# Patient Record
Sex: FEMALE | Race: WHITE | NOT HISPANIC OR LATINO | Employment: FULL TIME | URBAN - METROPOLITAN AREA
[De-identification: names, ages, dates, MRNs, and addresses within clinical notes are randomized per-mention and may not be internally consistent; named-entity substitution may affect disease eponyms.]

---

## 2018-01-16 ENCOUNTER — OFFICE VISIT (OUTPATIENT)
Dept: PLASTIC SURGERY | Facility: CLINIC | Age: 38
End: 2018-01-16
Attending: PLASTIC SURGERY
Payer: COMMERCIAL

## 2018-01-16 VITALS
TEMPERATURE: 98.6 F | SYSTOLIC BLOOD PRESSURE: 149 MMHG | WEIGHT: 192.13 LBS | DIASTOLIC BLOOD PRESSURE: 97 MMHG | HEART RATE: 97 BPM | BODY MASS INDEX: 30.88 KG/M2 | OXYGEN SATURATION: 97 % | RESPIRATION RATE: 16 BRPM | HEIGHT: 66 IN

## 2018-01-16 DIAGNOSIS — N62 HYPERTROPHY OF BREAST: Primary | ICD-10-CM

## 2018-01-16 PROCEDURE — G0463 HOSPITAL OUTPT CLINIC VISIT: HCPCS | Mod: ZF

## 2018-01-16 ASSESSMENT — PAIN SCALES - GENERAL: PAINLEVEL: NO PAIN (0)

## 2018-01-16 NOTE — LETTER
1/16/2018       RE: Rosa Maria Kelley  [address]  [facility] [address]     Dear Colleague,    Thank you for referring your patient, Rosa Maria Kelley, to the [facility] at [facility]. Please see a copy of my visit note below.    PRESENTING COMPLAINT:  Breast reduction surgery.      HISTORY OF PRESENT ILLNESS:  Rosa Maria is 38 years old.  She has been large breasted all of her adult and adolescent life.  She wears an H-cup bra.  She would like to be around a C or D cup.  She is unhappy with the amount of weight that she has to carry on her upper neck, back and shoulders from her heavy breasts.  She gets a lot of upper back, neck, shoulder pain, shoulder grooving from the bra straps and inframammary fold rashes, especially during summers.  She has used all sorts of over-the-counter pain medication and appropriate supportive garments without continued relief.  She has never had a mammogram.  No family history of breast cancer.  Never had breast biopsy.      PAST MEDICAL HISTORY:  Nil.      PAST SURGICAL HISTORY:  Hysterectomy.      MEDICATIONS:  Testosterone implant.      ALLERGIES:  Nil.      SOCIAL HISTORY:  Does not smoke, socially drinks.  Works in [employer].      REVIEW OF SYSTEMS:  Denies chest pain, shortness of breath, MI, CVA, DVT and PE.      PHYSICAL EXAMINATION:  Vital signs stable.  She is afebrile, in no obvious distress.  She is 5 feet 6 inches, 192 pounds, body surface area 1.98 m2.  On examination of her breasts, she has grade 2 on 3 ptosis.  Left breast is slightly lower than the right.  Right side is about 5% larger than the left.  Sternal notch to nipple distance well under 40 cm.  No palpable masses.  No axillary or cervical lymphadenopathy.      ASSESSMENT AND PLAN:  Based on above findings, a diagnosis of symptomatic bilateral breast hypertrophy was made.  I had a long discussion with the patient and her  about breast reduction surgery.  I  explained to them what that would entail.  Showed her where the scars would be.  Explained to her the concept as well as the expectations from the surgery.  I was very clear about the fact that I could not guarantee cup sizes, but based on her exam and her Schnur scale, 600 grams needs to be removed from each breast.  I think that is possible, but I do not think it will leave her with a D-cup bra and would probably leave her with a small C cup.  She needs to be okay with that.  She seems like she is.  Had a long discussion with her about prior authorization.  Consent was obtained and photographs obtained.  We will try and get prior authorization as soon as possible.  Went over the surgery itself with her in detail.  All risks, benefits and alternatives of the procedure including pain, infection, bleeding, scarring, asymmetry, seromas, hematomas, wound breakdown, wound dehiscence, prominent scar, hypertrophic scar, keloid scar, nipple sensory loss, nipple sensory change, breast sensory loss, breast sensory change, skin necrosis, fat necrosis, T-junction site necrosis, nipple necrosis, asymmetries of the breasts, standing cutaneous cones, seromas, hematomas, DVT, PE, MI, CVA, pneumonia, renal failure and death were all explained.  She will need a mammogram prior to the surgery.  She will need to be cleared from a medical standpoint.  All questions were answered.  She was happy with the visit.  I look forward to helping her out in the near future.  All exam and discussion were done in the presence of my nurse.      Total time spent with patient was 40 minutes, more than half was counseling.         Again, thank you for allowing me to participate in the care of your patient.      Sincerely,    NUSRAT Rahman MD

## 2018-01-16 NOTE — MR AVS SNAPSHOT
"              After Visit Summary   2018    Rosa Maria Kelley    MRN: [id number]           Patient Information     Date Of Birth          1980        Visit Information        Provider Department      2018 8:00 AM NUSRAT Rahman MD [facility]        Today's Diagnoses     Hypertrophy of breast    -  1       Follow-ups after your visit        Follow-up notes from your care team     Return if symptoms worsen or fail to improve.      Who to contact     If you have questions or need follow up information about today's clinic visit or your schedule please contact [facility] directly at [phone].  Normal or non-critical lab and imaging results will be communicated to you by [facility], letter or phone within 4 business days after the clinic has received the results. If you do not hear from us within 7 days, please contact the clinic through [facility] or phone. If you have a critical or abnormal lab result, we will notify you by phone as soon as possible.  Submit refill requests through [facility] or call your pharmacy and they will forward the refill request to us. Please allow 3 business days for your refill to be completed.          Additional Information About Your Visit        MyChart Information     Plerts lets you send messages to your doctor, view your test results, renew your prescriptions, schedule appointments and more. To sign up, go to [email or web address] . Click on \"Log in\" on the left side of the screen, which will take you to the Welcome page. Then click on \"Sign up Now\" on the right side of the page.     You will be asked to enter the access code listed below, as well as some personal information. Please follow the directions to create your username and password.     Your access code is: [email or web address]  Expires: 2018  9:25 AM     Your access code will  in 90 days. If you need help or a new code, please call your [place] clinic or [phone].        Care " "EveryWhere ID     This is your Care EveryWhere ID. This could be used by other organizations to access your [facility] medical records  [id number]        Your Vitals Were     Pulse Temperature Respirations Height Pulse Oximetry BMI (Body Mass Index)    97 98.6  F (37  C) (Oral) 16 5' 6\" 97% 31.01 kg/m2       Blood Pressure from Last 3 Encounters:   01/16/18 (!) 149/97    Weight from Last 3 Encounters:   01/16/18 192 lb 2 oz              Today, you had the following     No orders found for display       Primary Care Provider    None Specified       No primary provider on file.        Equal Access to Services     [facility]: Hadii barrera Ceballos, washannon tim, papo bee, tali cruz . So [facility] [phone].    ATENCIÓN: Si habla español, tiene a montejo disposición servicios gratuitos de asistencia lingüística. Llame al [phone].    We comply with applicable federal civil rights laws and [place] laws. We do not discriminate on the basis of race, color, national origin, age, disability, sex, sexual orientation, or gender identity.            Thank you!     Thank you for choosing [facility]  for your care. Our goal is always to provide you with excellent care. Hearing back from our patients is one way we can continue to improve our services. Please take a few minutes to complete the written survey that you may receive in the mail after your visit with us. Thank you!             Your Updated Medication List - Protect others around you: Learn how to safely use, store and throw away your medicines at www.disposemymeds.org.      Notice  As of 1/16/2018 10:12 AM    You have not been prescribed any medications.      "

## 2018-01-16 NOTE — NURSING NOTE
"  Oncology Rooming Note    January 16, 2018 8:18 AM   Rosa aMria Kelley is a 38 year old female who presents for:    Chief Complaint   Patient presents with     Oncology Clinic Visit     new patient breast reduction     Initial Vitals: BP (!) 149/97 (BP Location: Right arm, Patient Position: Chair, Cuff Size: Adult Regular)  Pulse 97  Temp 98.6  F (37  C) (Oral)  Resp 16  Ht 1.676 m (5' 6\")  Wt 87.1 kg (192 lb 2 oz)  SpO2 97%  BMI 31.01 kg/m2 Estimated body mass index is 31.01 kg/(m^2) as calculated from the following:    Height as of this encounter: 1.676 m (5' 6\").    Weight as of this encounter: 87.1 kg (192 lb 2 oz). Body surface area is 2.01 meters squared.  No Pain (0) Comment: Data Unavailable   No LMP recorded. Patient has had a hysterectomy.  Allergies reviewed: No  Medications reviewed: No    Medications: Medication refills not needed today.  Pharmacy name entered into EPIC: Data Unavailable    Clinical concerns: new patient Dr. Rahman was NOT notified.    5 minutes for nursing intake (face to face time)     Diana Macdonald CMA                          "

## 2018-01-16 NOTE — PROGRESS NOTES
PRESENTING COMPLAINT:  Breast reduction surgery.      HISTORY OF PRESENT ILLNESS:  Rosa Maria is 38 years old.  She has been large breasted all of her adult and adolescent life.  She wears an H-cup bra.  She would like to be around a C or D cup.  She is unhappy with the amount of weight that she has to carry on her upper neck, back and shoulders from her heavy breasts.  She gets a lot of upper back, neck, shoulder pain, shoulder grooving from the bra straps and inframammary fold rashes, especially during summers.  She has used all sorts of over-the-counter pain medication and appropriate supportive garments without continued relief.  She has never had a mammogram.  No family history of breast cancer.  Never had breast biopsy.      PAST MEDICAL HISTORY:  Nil.      PAST SURGICAL HISTORY:  Hysterectomy.      MEDICATIONS:  Testosterone implant.      ALLERGIES:  Nil.      SOCIAL HISTORY:  Does not smoke, socially drinks.  Works in [employer].      REVIEW OF SYSTEMS:  Denies chest pain, shortness of breath, MI, CVA, DVT and PE.      PHYSICAL EXAMINATION:  Vital signs stable.  She is afebrile, in no obvious distress.  She is 5 feet 6 inches, 192 pounds, body surface area 1.98 m2.  On examination of her breasts, she has grade 2 on 3 ptosis.  Left breast is slightly lower than the right.  Right side is about 5% larger than the left.  Sternal notch to nipple distance well under 40 cm.  No palpable masses.  No axillary or cervical lymphadenopathy.      ASSESSMENT AND PLAN:  Based on above findings, a diagnosis of symptomatic bilateral breast hypertrophy was made.  I had a long discussion with the patient and her  about breast reduction surgery.  I explained to them what that would entail.  Showed her where the scars would be.  Explained to her the concept as well as the expectations from the surgery.  I was very clear about the fact that I could not guarantee cup sizes, but based on her exam and her Schnur scale, 600 grams  needs to be removed from each breast.  I think that is possible, but I do not think it will leave her with a D-cup bra and would probably leave her with a small C cup.  She needs to be okay with that.  She seems like she is.  Had a long discussion with her about prior authorization.  Consent was obtained and photographs obtained.  We will try and get prior authorization as soon as possible.  Went over the surgery itself with her in detail.  All risks, benefits and alternatives of the procedure including pain, infection, bleeding, scarring, asymmetry, seromas, hematomas, wound breakdown, wound dehiscence, prominent scar, hypertrophic scar, keloid scar, nipple sensory loss, nipple sensory change, breast sensory loss, breast sensory change, skin necrosis, fat necrosis, T-junction site necrosis, nipple necrosis, asymmetries of the breasts, standing cutaneous cones, seromas, hematomas, DVT, PE, MI, CVA, pneumonia, renal failure and death were all explained.  She will need a mammogram prior to the surgery.  She will need to be cleared from a medical standpoint.  All questions were answered.  She was happy with the visit.  I look forward to helping her out in the near future.  All exam and discussion were done in the presence of my nurse.      Total time spent with patient was 40 minutes, more than half was counseling.

## 2018-01-16 NOTE — LETTER
Date:January 17, 2018      Patient was self referred, no letter generated. Do not send.        [facility] Health Information

## 2018-01-18 ENCOUNTER — TELEPHONE (OUTPATIENT)
Dept: SURGERY | Facility: CLINIC | Age: 38
End: 2018-01-18

## 2018-01-22 ENCOUNTER — TELEPHONE (OUTPATIENT)
Dept: SURGERY | Facility: CLINIC | Age: 38
End: 2018-01-22

## 2018-01-22 NOTE — TELEPHONE ENCOUNTER
Faxed clinical to [facility], ref#[id number] and clinical to [facility], called and let patient know I was doing this, should take 5-15 days to hear back

## 2018-01-23 ENCOUNTER — TELEPHONE (OUTPATIENT)
Dept: SURGERY | Facility: CLINIC | Age: 38
End: 2018-01-23

## 2018-01-23 NOTE — TELEPHONE ENCOUNTER
Received phone call from Sadaf at [facility], wanting to know if there were photos, emailed them to [email or web address]

## 2018-01-25 ENCOUNTER — TRANSFERRED RECORDS (OUTPATIENT)
Dept: HEALTH INFORMATION MANAGEMENT | Facility: CLINIC | Age: 38
End: 2018-01-25

## 2018-01-29 ENCOUNTER — TELEPHONE (OUTPATIENT)
Dept: SURGERY | Facility: CLINIC | Age: 38
End: 2018-01-29

## 2018-01-29 NOTE — TELEPHONE ENCOUNTER
Received phone call from Sydnie @[facility], surgery for bilateral reduction mammoplasty was approved, CASE#[id number], with date span 1/22/18-1/22/19.  A copy will not be sent to me.  Need to wait for approval from [facility]

## 2018-02-06 ENCOUNTER — TELEPHONE (OUTPATIENT)
Dept: SURGERY | Facility: CLINIC | Age: 38
End: 2018-02-06

## 2018-02-06 NOTE — TELEPHONE ENCOUNTER
Talked to [email or web address], surgery was approved, I then received a fax for the approavl, left voice message with patient to call me back

## 2018-02-19 ENCOUNTER — TELEPHONE (OUTPATIENT)
Dept: SURGERY | Facility: CLINIC | Age: 38
End: 2018-02-19

## 2018-02-19 NOTE — TELEPHONE ENCOUNTER
pt called, wanting to know if I received copy of approval letter from [facility], read the notes from January, no, they are not sending a copy to me.  Patient wanted to know how many grams were approved, told her it was 600 per breast per notes.  Told her maybe she can call [facility] and ask for a copy of the authorization.  Also she is interested in changing the pre-op date, so she will call Lori

## 2018-03-20 ENCOUNTER — OFFICE VISIT (OUTPATIENT)
Dept: PLASTIC SURGERY | Facility: CLINIC | Age: 38
End: 2018-03-20
Attending: PLASTIC SURGERY
Payer: COMMERCIAL

## 2018-03-20 VITALS
HEART RATE: 60 BPM | OXYGEN SATURATION: 97 % | WEIGHT: 180.5 LBS | TEMPERATURE: 98.5 F | SYSTOLIC BLOOD PRESSURE: 142 MMHG | HEIGHT: 66 IN | DIASTOLIC BLOOD PRESSURE: 92 MMHG | RESPIRATION RATE: 18 BRPM | BODY MASS INDEX: 29.01 KG/M2

## 2018-03-20 DIAGNOSIS — N62 HYPERTROPHY OF BREAST: Primary | ICD-10-CM

## 2018-03-20 PROCEDURE — G0463 HOSPITAL OUTPT CLINIC VISIT: HCPCS | Mod: ZF

## 2018-03-20 ASSESSMENT — PAIN SCALES - GENERAL: PAINLEVEL: NO PAIN (0)

## 2018-03-20 NOTE — LETTER
3/20/2018       RE: Rosa Maria Kelley  [address]  [facility] [address]     Dear Colleague,    Thank you for referring your patient, Rosa Maria Kelley, to the [facility] at [facility]. Please see a copy of my visit note below.    PRESENTING COMPLAINT:  Preoperative visit for upcoming bilateral breast reduction scheduled for Monday.      HISTORY OF PRESENT ILLNESS:  Ms. Kelley is 38 years old.  She is scheduled for breast reduction.  The plan is to do a 600-gram reduction.  She had a mammogram done recently, which was according to the patient normal.  She has no other change in her history and physical exam.      ASSESSMENT AND PLAN:  Based on above findings, a diagnosis of bilateral breast hypertrophy was made.  Plan is to do a bilateral breast reduction.  Went over the entire procedure with her in detail.  All risks, benefits and alternatives were explained in the consult once again and were explained in detail.  She understood everything and wants to proceed.  All questions were answered.  All exam done in the presence of my nurse.  She was happy with the visit.      Total time spent with patient was 15 minutes, more than half was counseling.         Again, thank you for allowing me to participate in the care of your patient.      Sincerely,    NUSRAT Rahman MD

## 2018-03-20 NOTE — NURSING NOTE
"Oncology Rooming Note    March 20, 2018 10:35 AM   Rosa Maria Kelley is a 38 year old female who presents for:    No chief complaint on file.    Initial Vitals: BP (!) 142/92 (BP Location: Right arm, Patient Position: Sitting, Cuff Size: Adult Regular)  Pulse 60  Temp 98.5  F (36.9  C) (Oral)  Resp 18  Ht 1.676 m (5' 5.98\")  Wt 81.9 kg (180 lb 8 oz)  SpO2 97%  BMI 29.15 kg/m2 Estimated body mass index is 29.15 kg/(m^2) as calculated from the following:    Height as of this encounter: 1.676 m (5' 5.98\").    Weight as of this encounter: 81.9 kg (180 lb 8 oz). Body surface area is 1.95 meters squared.  No Pain (0) Comment: Data Unavailable   No LMP recorded. Patient has had a hysterectomy.  Allergies reviewed: Yes  Medications reviewed: Yes    Medications: Medication refills not needed today.  Pharmacy name entered into [facility]: [place], [place] -  [facility] [phone]    Clinical concerns: No new concerns. Provider was notified.    10 minutes for nursing intake (face to face time)     Jeny Perry LPN              "

## 2018-03-20 NOTE — PROGRESS NOTES
PRESENTING COMPLAINT:  Preoperative visit for upcoming bilateral breast reduction scheduled for Monday.      HISTORY OF PRESENT ILLNESS:  Ms. Kelley is 38 years old.  She is scheduled for breast reduction.  The plan is to do a 600-gram reduction.  She had a mammogram done recently, which was according to the patient normal.  She has no other change in her history and physical exam.      ASSESSMENT AND PLAN:  Based on above findings, a diagnosis of bilateral breast hypertrophy was made.  Plan is to do a bilateral breast reduction.  Went over the entire procedure with her in detail.  All risks, benefits and alternatives were explained in the consult once again and were explained in detail.  She understood everything and wants to proceed.  All questions were answered.  All exam done in the presence of my nurse.  She was happy with the visit.      Total time spent with patient was 15 minutes, more than half was counseling.

## 2018-03-20 NOTE — MR AVS SNAPSHOT
After Visit Summary   3/20/2018    Rosa Maria Kelley    MRN: [id number]           Patient Information     Date Of Birth          1980        Visit Information        Provider Department      3/20/2018 10:45 AM NUSRAT Rahman MD [facility]        Today's Diagnoses     Hypertrophy of breast    -  1       Follow-ups after your visit        Follow-up notes from your care team     Return if symptoms worsen or fail to improve.      Your next 10 appointments already scheduled     Mar 26, 2018   Procedure with NUSRAT Rahman MD   [facility] ([facility])    [address] [place] [address]  5th Floor  [facility] [address] [phone]           Located in the Clinics [facility] at [address] [place] [address].   parking is very convenient and highly recommended.  is a $6 flat rate fee.  Both  and self parkers should enter the main arrival plaza from [facility]; parking attendants will direct you based on your parking preference.            Apr 03, 2018  7:15 AM CDT   (Arrive by 7:00 AM)   Post-Op with NUSRAT Rahman MD   [facility] ([facility])    [address] [place] [address]  [address]  [facility] [address] [phone]              Who to contact     If you have questions or need follow up information about today's clinic visit or your schedule please contact [facility] directly at [phone].  Normal or non-critical lab and imaging results will be communicated to you by MyChart, letter or phone within 4 business days after the clinic has received the results. If you do not hear from us within 7 days, please contact the clinic through MyChart or phone. If you have a critical or abnormal lab result, we will notify you by phone as soon as possible.  Submit refill requests through [facility] or call your pharmacy and they will forward the refill  "request to us. Please allow 3 business days for your refill to be completed.          Additional Information About Your Visit        MyChart Information     [employer] lets you send messages to your doctor, view your test results, renew your prescriptions, schedule appointments and more. To sign up, go to [email or web address] . Click on \"Log in\" on the left side of the screen, which will take you to the Welcome page. Then click on \"Sign up Now\" on the right side of the page.     You will be asked to enter the access code listed below, as well as some personal information. Please follow the directions to create your username and password.     Your access code is: [email or web address]  Expires: 2018  7:30 AM     Your access code will  in 90 days. If you need help or a new code, please call your [place] clinic or [phone].        Care EveryWhere ID     This is your [facility] EveryWhere ID. This could be used by other organizations to access your [place] medical records  [id number]        Your Vitals Were     Pulse Temperature Respirations Height Pulse Oximetry BMI (Body Mass Index)    60 98.5  F (36.9  C) (Oral) 18 5' 5.98\" 97% 29.15 kg/m2       Blood Pressure from Last 3 Encounters:   18 (!) 142/92   18 (!) 149/97    Weight from Last 3 Encounters:   18 180 lb 8 oz   18 192 lb 2 oz              Today, you had the following     No orders found for display       Primary Care Provider Office Phone # Fax #    Trace Morgan [phone]       [place] [id number]        Equal Access to Services     [facility] : Tommy Ceballos, jass tim, tali tucker. So [facility] [phone].    ATENCIÓN: Si habla español, tiene a montejo disposición servicios gratuitos de asistencia lingüística. Llame al [phone].    We comply with applicable federal civil rights laws and " [place] laws. We do not discriminate on the basis of race, color, national origin, age, disability, sex, sexual orientation, or gender identity.            Thank you!     Thank you for choosing [facility]  for your care. Our goal is always to provide you with excellent care. Hearing back from our patients is one way we can continue to improve our services. Please take a few minutes to complete the written survey that you may receive in the mail after your visit with us. Thank you!             Your Updated Medication List - Protect others around you: Learn how to safely use, store and throw away your medicines at www.disposemymeds.org.      Notice  As of 3/20/2018  4:55 PM    You have not been prescribed any medications.

## 2018-03-23 ENCOUNTER — ANESTHESIA EVENT (OUTPATIENT)
Dept: SURGERY | Facility: AMBULATORY SURGERY CENTER | Age: 38
End: 2018-03-23

## 2018-03-26 ENCOUNTER — HOSPITAL ENCOUNTER (OUTPATIENT)
Facility: AMBULATORY SURGERY CENTER | Age: 38
End: 2018-03-26
Attending: PLASTIC SURGERY
Payer: COMMERCIAL

## 2018-03-26 ENCOUNTER — SURGERY (OUTPATIENT)
Age: 38
End: 2018-03-26

## 2018-03-26 ENCOUNTER — ANESTHESIA (OUTPATIENT)
Dept: SURGERY | Facility: AMBULATORY SURGERY CENTER | Age: 38
End: 2018-03-26

## 2018-03-26 VITALS
HEIGHT: 66 IN | WEIGHT: 178 LBS | DIASTOLIC BLOOD PRESSURE: 80 MMHG | BODY MASS INDEX: 28.61 KG/M2 | RESPIRATION RATE: 12 BRPM | TEMPERATURE: 97 F | OXYGEN SATURATION: 95 % | SYSTOLIC BLOOD PRESSURE: 122 MMHG

## 2018-03-26 DIAGNOSIS — Z98.890 S/P BILATERAL BREAST REDUCTION: Primary | ICD-10-CM

## 2018-03-26 RX ORDER — DEXAMETHASONE SODIUM PHOSPHATE 4 MG/ML
INJECTION, SOLUTION INTRA-ARTICULAR; INTRALESIONAL; INTRAMUSCULAR; INTRAVENOUS; SOFT TISSUE PRN
Status: DISCONTINUED | OUTPATIENT
Start: 2018-03-26 | End: 2018-03-26

## 2018-03-26 RX ORDER — ONDANSETRON 4 MG/1
4 TABLET, ORALLY DISINTEGRATING ORAL EVERY 30 MIN PRN
Status: DISCONTINUED | OUTPATIENT
Start: 2018-03-26 | End: 2018-03-27 | Stop reason: HOSPADM

## 2018-03-26 RX ORDER — AMOXICILLIN 250 MG
1-2 CAPSULE ORAL 2 TIMES DAILY
Qty: 30 TABLET | Refills: 0 | Status: SHIPPED | OUTPATIENT
Start: 2018-03-26

## 2018-03-26 RX ORDER — SODIUM CHLORIDE, SODIUM LACTATE, POTASSIUM CHLORIDE, CALCIUM CHLORIDE 600; 310; 30; 20 MG/100ML; MG/100ML; MG/100ML; MG/100ML
INJECTION, SOLUTION INTRAVENOUS CONTINUOUS
Status: DISCONTINUED | OUTPATIENT
Start: 2018-03-26 | End: 2018-03-27 | Stop reason: HOSPADM

## 2018-03-26 RX ORDER — PROPOFOL 10 MG/ML
INJECTION, EMULSION INTRAVENOUS CONTINUOUS PRN
Status: DISCONTINUED | OUTPATIENT
Start: 2018-03-26 | End: 2018-03-26

## 2018-03-26 RX ORDER — SODIUM CHLORIDE, SODIUM LACTATE, POTASSIUM CHLORIDE, CALCIUM CHLORIDE 600; 310; 30; 20 MG/100ML; MG/100ML; MG/100ML; MG/100ML
INJECTION, SOLUTION INTRAVENOUS CONTINUOUS
Status: DISCONTINUED | OUTPATIENT
Start: 2018-03-26 | End: 2018-03-26 | Stop reason: HOSPADM

## 2018-03-26 RX ORDER — ONDANSETRON 2 MG/ML
INJECTION INTRAMUSCULAR; INTRAVENOUS PRN
Status: DISCONTINUED | OUTPATIENT
Start: 2018-03-26 | End: 2018-03-26

## 2018-03-26 RX ORDER — NALOXONE HYDROCHLORIDE 0.4 MG/ML
.1-.4 INJECTION, SOLUTION INTRAMUSCULAR; INTRAVENOUS; SUBCUTANEOUS
Status: DISCONTINUED | OUTPATIENT
Start: 2018-03-26 | End: 2018-03-27 | Stop reason: HOSPADM

## 2018-03-26 RX ORDER — FENTANYL CITRATE 50 UG/ML
25-50 INJECTION, SOLUTION INTRAMUSCULAR; INTRAVENOUS EVERY 5 MIN PRN
Status: DISCONTINUED | OUTPATIENT
Start: 2018-03-26 | End: 2018-03-26 | Stop reason: HOSPADM

## 2018-03-26 RX ORDER — FENTANYL CITRATE 50 UG/ML
25-50 INJECTION, SOLUTION INTRAMUSCULAR; INTRAVENOUS
Status: DISCONTINUED | OUTPATIENT
Start: 2018-03-26 | End: 2018-03-26 | Stop reason: HOSPADM

## 2018-03-26 RX ORDER — OXYCODONE HYDROCHLORIDE 5 MG/1
5 TABLET ORAL EVERY 4 HOURS PRN
Status: DISCONTINUED | OUTPATIENT
Start: 2018-03-26 | End: 2018-03-27 | Stop reason: HOSPADM

## 2018-03-26 RX ORDER — ONDANSETRON 4 MG/1
4 TABLET, ORALLY DISINTEGRATING ORAL EVERY 6 HOURS PRN
Qty: 8 TABLET | Refills: 0 | Status: SHIPPED | OUTPATIENT
Start: 2018-03-26

## 2018-03-26 RX ORDER — PROPOFOL 10 MG/ML
INJECTION, EMULSION INTRAVENOUS PRN
Status: DISCONTINUED | OUTPATIENT
Start: 2018-03-26 | End: 2018-03-26

## 2018-03-26 RX ORDER — NALOXONE HYDROCHLORIDE 0.4 MG/ML
.1-.4 INJECTION, SOLUTION INTRAMUSCULAR; INTRAVENOUS; SUBCUTANEOUS
Status: DISCONTINUED | OUTPATIENT
Start: 2018-03-26 | End: 2018-03-26 | Stop reason: HOSPADM

## 2018-03-26 RX ORDER — ACETAMINOPHEN 325 MG/1
975 TABLET ORAL ONCE
Status: COMPLETED | OUTPATIENT
Start: 2018-03-26 | End: 2018-03-26

## 2018-03-26 RX ORDER — KETOROLAC TROMETHAMINE 30 MG/ML
INJECTION, SOLUTION INTRAMUSCULAR; INTRAVENOUS PRN
Status: DISCONTINUED | OUTPATIENT
Start: 2018-03-26 | End: 2018-03-26

## 2018-03-26 RX ORDER — ONDANSETRON 2 MG/ML
4 INJECTION INTRAMUSCULAR; INTRAVENOUS EVERY 30 MIN PRN
Status: DISCONTINUED | OUTPATIENT
Start: 2018-03-26 | End: 2018-03-27 | Stop reason: HOSPADM

## 2018-03-26 RX ORDER — GABAPENTIN 300 MG/1
300 CAPSULE ORAL ONCE
Status: COMPLETED | OUTPATIENT
Start: 2018-03-26 | End: 2018-03-26

## 2018-03-26 RX ORDER — MEPERIDINE HYDROCHLORIDE 25 MG/ML
12.5 INJECTION INTRAMUSCULAR; INTRAVENOUS; SUBCUTANEOUS
Status: DISCONTINUED | OUTPATIENT
Start: 2018-03-26 | End: 2018-03-27 | Stop reason: HOSPADM

## 2018-03-26 RX ORDER — FLUMAZENIL 0.1 MG/ML
0.2 INJECTION, SOLUTION INTRAVENOUS
Status: DISCONTINUED | OUTPATIENT
Start: 2018-03-26 | End: 2018-03-26 | Stop reason: HOSPADM

## 2018-03-26 RX ORDER — OXYCODONE HYDROCHLORIDE 5 MG/1
5-10 TABLET ORAL EVERY 6 HOURS PRN
Qty: 30 TABLET | Refills: 0 | Status: SHIPPED | OUTPATIENT
Start: 2018-03-26

## 2018-03-26 RX ORDER — BUPIVACAINE HYDROCHLORIDE AND EPINEPHRINE 2.5; 5 MG/ML; UG/ML
INJECTION, SOLUTION INFILTRATION; PERINEURAL PRN
Status: DISCONTINUED | OUTPATIENT
Start: 2018-03-26 | End: 2018-03-26

## 2018-03-26 RX ADMIN — OXYCODONE HYDROCHLORIDE 5 MG: 5 TABLET ORAL at 10:13

## 2018-03-26 RX ADMIN — DEXAMETHASONE SODIUM PHOSPHATE 4 MG: 4 INJECTION, SOLUTION INTRA-ARTICULAR; INTRALESIONAL; INTRAMUSCULAR; INTRAVENOUS; SOFT TISSUE at 08:00

## 2018-03-26 RX ADMIN — KETOROLAC TROMETHAMINE 30 MG: 30 INJECTION, SOLUTION INTRAMUSCULAR; INTRAVENOUS at 09:15

## 2018-03-26 RX ADMIN — FENTANYL CITRATE 50 MCG: 50 INJECTION, SOLUTION INTRAMUSCULAR; INTRAVENOUS at 08:32

## 2018-03-26 RX ADMIN — PROPOFOL 160 MG: 10 INJECTION, EMULSION INTRAVENOUS at 07:55

## 2018-03-26 RX ADMIN — FENTANYL CITRATE 50 MCG: 50 INJECTION, SOLUTION INTRAMUSCULAR; INTRAVENOUS at 10:07

## 2018-03-26 RX ADMIN — FENTANYL CITRATE 50 MCG: 50 INJECTION, SOLUTION INTRAMUSCULAR; INTRAVENOUS at 08:22

## 2018-03-26 RX ADMIN — ONDANSETRON 4 MG: 2 INJECTION INTRAMUSCULAR; INTRAVENOUS at 08:00

## 2018-03-26 RX ADMIN — ACETAMINOPHEN 975 MG: 325 TABLET ORAL at 07:05

## 2018-03-26 RX ADMIN — PROPOFOL 40 MG: 10 INJECTION, EMULSION INTRAVENOUS at 08:32

## 2018-03-26 RX ADMIN — PROPOFOL: 10 INJECTION, EMULSION INTRAVENOUS at 08:35

## 2018-03-26 RX ADMIN — GABAPENTIN 300 MG: 300 CAPSULE ORAL at 07:05

## 2018-03-26 RX ADMIN — FENTANYL CITRATE 50 MCG: 50 INJECTION, SOLUTION INTRAMUSCULAR; INTRAVENOUS at 10:09

## 2018-03-26 RX ADMIN — PROPOFOL 200 MCG/KG/MIN: 10 INJECTION, EMULSION INTRAVENOUS at 07:55

## 2018-03-26 RX ADMIN — FENTANYL CITRATE 50 MCG: 50 INJECTION, SOLUTION INTRAMUSCULAR; INTRAVENOUS at 07:32

## 2018-03-26 RX ADMIN — SODIUM CHLORIDE, SODIUM LACTATE, POTASSIUM CHLORIDE, CALCIUM CHLORIDE: 600; 310; 30; 20 INJECTION, SOLUTION INTRAVENOUS at 07:16

## 2018-03-26 RX ADMIN — BUPIVACAINE HYDROCHLORIDE AND EPINEPHRINE 30 ML: 2.5; 5 INJECTION, SOLUTION INFILTRATION; PERINEURAL at 07:42

## 2018-03-26 ASSESSMENT — LIFESTYLE VARIABLES: TOBACCO_USE: 0

## 2018-03-26 NOTE — DISCHARGE INSTRUCTIONS
"Information about liposomal bupivacaine (Exparel)    What is Liposomal Bupivacaine?    Liposomal Bupivacaine is a numbing medication that can help you manage your pain after surgery.  This medication is similar to \"novacaine,\" which is often used by the dentist.  Liposomal bupivacaine is released slowly and can help control pain for up to 72 hours.    What is the purpose of Liposomal Bupivacaine?    To manage your pain after surgery    To help you sleep better, take deep breaths, walk more comfortable, and feel up to visiting with others    How is the procedure done?    Liposomal bupivacaine is a medication given by an injection.    It is usually given right before your surgery.  If this is the case, you will be awake or sedated, but you should experience minimal pain during the procedure.    For some people, the injection may be given at the very end of your surgery.  It all depends on the type of surgery and your situation.    The procedure usually takes about 5-15 minutes.  An ultrasound machine will help the anesthesiologist insert it in the right place or the surgeon will inject it under direct vision.     A needle is used to place the numbing medication under your skin.  It provides pain relief by numbing the tissue in the area where your surgeon will make the incision.    What can I expect?    You may experience numbness, tingling, or a feeling of heaviness around the area that was injected.    If you experience any of the follow symptoms IMMEDIATELY CALL THE REGIONAL ANESTHESIA PAIN SERVICE:    Numbness or tingling occurs in areas other than around the injection site    Blurry vision    Ringing in your ears    A metallic taste in your mouth    PAGE: Dial [phone].  When prompted, enter the following 4-digit ID number:  [id number].  You will be prompted to enter your phone number; and then enter the # sign.  The clinician on call will call you back.    OR  CALL: Dial [phone].  Let the hospital  " know that you are having a problem with a nerve block and that you would like to speak to the regional anesthesia pain service right away.    You should not receive any other type of numbing medication within 4 days after receiving liposomal bupivacaine unless your anesthesiologist approves.    Post Operative Instructions: Regional Anesthetic with Liposomal Bupivacaine for Chest and Abdominal Surgery  General Information:   Regional anesthesia is when local anesthetic or  numbing  medication is injected around the nerves to anesthetize or  numb  the area supplied by that set of nerves.     Types of Regional Blocks:  Transversus Abdominis Plane (TAP): A block injected beneath the covering of a muscle layer of the abdomen for abdominal surgery  Pectoral: A block injected near the breast for surgery on the breast and armpit  Paravertebral: A block injected in the back for surgery on the chest, ribs, and breast    Procedure:  The type of anesthesia your doctor used to numb your chest or abdomen will usually not wear off for 24-48 hours, but may last as long as 72 hours.     Diet:  There are no restrictions on your diet. You should drink plenty of fluids.     Discomfort:  You will have a tingling and prickly sensation in your chest or abdomen as the feeling begins to return. You can also expect some discomfort. The amount of discomfort is unpredictable, but if you have more pain than can be controlled with pain medication you should notify your physician.     Pain Medicine:   Begin taking your oral pain pills before bedtime and during the night to avoid a sudden onset of pain as part of the block wears off.  Do not engage in drinking, driving, or hazardous occupations while taking pain medication.     Stitches:   You may have stitches or special skin closures. You doctor will inform you when to return to the office to have them removed. [facility]  Home Care Following Anesthesia  For  24 hours after surgery:  1. Get plenty of rest.  A responsible adult must stay with you for at least 24 hours after you leave the surgery center.  2. Do not drive or use heavy equipment.  If you have weakness or tingling, don't drive or use heavy equipment until this feeling goes away.   3. Do not drink alcohol.   4. Avoid strenuous or risky activities.  Ask for help when climbing stairs.  5. You may feel lightheaded.  IF so, sit for a few minutes before standing.  Have someone help you get up.   6. If you have nausea (feel sick to your stomach): Drink only clear liquids such as apple juice, ginger ale, broth or 7-Up.  Rest may also help.  Be sure to drink enough fluids.  Move to a regular diet as you feel able.   7. You may have a slight fever.  Call the doctor if your fever is over 100 F (37.7 C) (taken under the tongue) or lasts longer than 24 hours.  8. You may have a dry mouth, a sore throat, muscle aches or trouble sleeping. These should go away after 24 hours.  9. Do not make important or legal decisions.            Tips for taking pain medications  To get the best pain relief possible, remember these points:    Take pain medications as directed, before pain becomes severe.    Pain medication can upset your stomach: taking it with food may help.    Constipation is a common side effect of pain medication. Drink plenty of  fluids.    Eat foods high in fiber. Take a stool softener if recommended by your doctor or pharmacist.    Do not drink alcohol, drive or operate machinery while taking pain medications.    Ask about other ways to control pain, such as with heat, ice or relaxation.    Tylenol/Acetaminophen Consumption  To help encourage the safe use of acetaminophen, the makers of TYLENOL  have lowered the maximum daily dose for single-ingredient Extra Strength TYLENOL  (acetaminophen) products sold in the [place] from 8 pills per day (4,000 mg) to 6 pills per day (3,000 mg). The dosing interval has also changed  from 2 pills every 4-6 hours to 2 pills every 6 hours.    If you feel your pain relief is insufficient, you may take Tylenol/Acetaminophen in addition to your narcotic pain medication.     Be careful not to exceed 3,000 mg of Tylenol/Acetaminophen in a 24 hour period from all sources.    If you are taking extra strength Tylenol/acetaminophen (500 mg), the maximum dose is 6 tablets in 24 hours.    If you are taking regular strength acetaminophen (325 mg), the maximum dose is 9 tablets in 24 hours.    Call a doctor for any of the followin. Signs of infection (fever, growing tenderness at the surgery site, a large amount of drainage or bleeding, severe pain, foul-smelling drainage, redness, swelling).  2. It has been over 8 to 10 hours since surgery and you are still not able to urinate (pass water).  3. Headache for over 24 hours.  4. Numbness, tingling or weakness the day after surgery (if you had spinal anesthesia).  Your doctor is:  Dr. Aylin Rahman, Plastic Surgery: [phone]                      For emergency care, call the:  [place] [facility]:  [phone] (TTY for hearing impaired: [phone])            BREAST REDUCTION POST-OPERATIVE INSTRUCTIONS    Instructions       Have someone drive you home after surgery and help you at home for 1-2      days.      Get plenty of rest.      Follow balanced diet.      Decreased activity may promote constipation, so you may want to add      more raw fruit to your diet, and be sure to increase fluid intake. Your doctor       may also order a stool softener.      Take pain medication as prescribed. Do not take aspirin or any products      containing aspirin.      Do not drink alcohol when taking pain medications.      Even when not taking pain medications, no alcohol for 3 weeks as it      causes fluid retention.      If you are taking vitamins with iron, resume these as tolerated.      Do not smoke, as smoking delays healing and increases the risk  of      complications.    Activities      Do not drive fpr 10 days following your procedure and until you are no longer taking        any pain medications (narcotics).      Do not drive until you have full range of motion with your arms and can stop the car       or swerve in an emergency.      Start walking the evening of surgery, this helps to reduce swelling and       lowers the chance of blood clots.      Refrain from vigorous activities for 2-6 weeks.  Increase activity gradually as tolerated.      After 2 weeks, you may perform lower body exercise, but must wait the full 6 weeks       prior to performing upper body exercise      Avoid lifting anything over 5 pounds for 2 weeks.      Resume social and employment activities in about 2 weeks (if not too strenuous).    Incision Care      You may shower in 48 hours.  If drainage tubes have been used, you may shower       48 hours after removal of the drains. The ACE wrap (if used) may be rewrapped as needed (if too tight or loose). Use it for support and may subtitute with a sports bra if preferred.       Avoid exposing scars to sun for at least 12 months.      Always use a strong sunblock, if sun exposure is unavoidable (SPF 50 or      greater).      Keep the tape on until it starts to curl up on the ends, and then gently remove them.        You may use moisturizing cream at 10 days to help the tape come off. By two weeks,      you may pull off the tape if still in place.      Wear your surgical bra/wrap 24/7 as directed for 4 weeks.      Avoid bras with stays and underwires for 4-6 weeks.      You may pad the incisions with gauze for comfort (panty liners also work well as they        are absorbent and inexpensive).      Inspect daily for signs of infection.      No tub soaking, bathing, or swimming until wounds are healed.      If your breast skin is dry after surgery, you can apply a moisturizer several times a       day.     What to Expect      Despite the  "three layers of sutures closing your incisions, there will be some oozing       of tissue fluid from them for 2 days or so.  This will soak up on the gauze and the bra       to look like more than it really is.  Report any significant drainage to the clinic.      Most of the higher discomfort will subside after the first few days.      You may experience temporary soreness, bruising, swelling and tightnessin the       breasts as well as discomfort in the incision area.      You may have have normal sensation in the nipples.  This may be more or less than       usual, and usually returns over a couple of months.      Your first menstruation following surgery may cause your breasts to swell and hurt.      You may have random shooting pains, tingling, or other strange sensations in the            skin for a few months.  These will subside.    Appearance      Most of the discoloration and swelling will subside in 2-4 weeks.      Your breasts will feel firm to the touch initially, but will soften with time.      A more natural shape will occur as the breasts \"settle\" in a slightly lower position over     the first few months.      Scars may be red and thick for 6-12 months (longer in lighter-skinned patients).  IN          time, these usually soften and fade.    Follow-Up Care      Typically, you will have a post op check at 1-2 weeks, and again with your surgeon in      another month.      If drainage tubes have been used, will be removed when the drainage is less than 30      ml per day for 1-2 days.  This usually happens in 1-3 weeks.    When to Call      If you have increased swelling or bruising, particular one side greater than the other.      If swelling and redness persist after a few days.      If you have increased redness along the incision.      If you have severe or increased pain not relieved by medication.      If you have any side effects to medications; such as, rash, nausea,      headache, vomiting, or " constipation.      If you have an oral temperature over 100.4 degrees.      If you have any yellowish or greenish drainage from the incisions or      notice a foul odor.      If you have bleeding from the incisions that is difficult to control with      light pressure.      If you have loss of feeling or motion.      Any unanswered concern.    For Medical Questions, Please Call:      [phone], Monday - Friday, 8 a.m. - 4:30 p.m.      After hours and on weekends, call Hospital Paging at [phone] and      ask for the Plastic Surgeon on call.

## 2018-03-26 NOTE — OP NOTE
PREOPERATIVE DIAGNOSIS: Symptomatic bilateral breast hypertrophy.     POSTOPERATIVE DIAGNOSIS: Symptomatic bilateral breast hypertrophy.     PROCEDURES: Bilateral superomedial pedicle inverted T skin closure breast reduction.     SURGEON: Aylin Rahman MD.     FELLOW: Carlos Manuel Castellanos MD    ANESTHESIA: General anesthesia with endotracheal intubation.     COMPLICATIONS: Nil.     DRAINS: Nil.     Blood Loss: 100 mL    SPECIMENS: Skin and breast tissue from right breast measuring about 600 g, left breast measuring about 515 g.     DESCRIPTION OF PROCEDURE: After informed consent was taken, the proper site and procedure was ascertained with the patient and was appropriately marked and taken to in the operating room.  She was placed in supine position with the knees comfortably flexed with pillows underneath them, and pneumoboots placed and running prior to induction of anesthesia. Preoperative antibiotics given in the OR. All pressure points were appropriately padded. General anesthesia was administered without any complications. She was placed in such a position that she could be flexed to about 50 degrees. Her arms were padded and abducted to about 50 degrees. She was prepped and draped in a standard surgical fashion. I began by first remarking the preop markings and marking a 42 mm areola on each side. I then marked out a superior medial pedicle on each side. I then de-epithelialized the pedicle on each side. I then dissected out the pedicle on each side without actually seeing the deep fascia and also released the pedicle such that it could rotate into its new nipple position without any tension. I then went ahead and on each side excised the inferomedial, inferior, inferolateral and lateral aspects of the breast according to a vertical pattern reduction. Strict hemostasis was ensured during this entire part of the case. Once this was done, I then temporarily closed the patient's breast in an inverted T fashion, sat  the patient up ensured symmetry and then marked out the new areolar opening symmetrically on each side. I then went ahead and closed the horizontal incision using 2-0 Monocryl suture in a deep dermal layer and 3-0 Strattafix suture in a running dermal layer. Then I made sure that the nipple areolar complex could be retrieved without any tension, which is could on each side. I then checked that they will both pink and viable, which they were. I then went ahead and excised the skin of the new areolar opening and de-epithelialized epithelialized the portion that was involved in the pedicle on each side. I then sutured in the nipple areolar complex using 2-0 Monocryl suture in a deep dermal fashion circumferentially. I then closed the vertical incision using 2-0 Monocryl suture to approximate the medial and lateral pillars, and then the deep dermis. I then ran all the incisions with 3-0 Monocryl suture in a running intracuticular manner followed by placement of Prineo, and then followed by an ACE wrap. At the end of the case, the patient's breasts were soft, nipples were pink, and breasts were symmetric. The patient tolerated the procedure well. All counts correct at the end of the case. The patient was extubated and sent to recovery room in a stable condition.

## 2018-03-26 NOTE — ANESTHESIA PROCEDURE NOTES
Peripheral Nerve Block Procedure Note    Staff:     Anesthesiologist:  CORRIE STUBBS  Location: Pre-op  Procedure Start/Stop TImes:      3/26/2018 7:30 AM     3/26/2018 7:40 AM    patient identified, IV checked, site marked, risks and benefits discussed, informed consent, monitors and equipment checked, pre-op evaluation, at physician/surgeon's request and post-op pain management      Correct Patient: Yes      Correct Position: Yes      Correct Site: Yes      Correct Procedure: Yes      Correct Laterality:  Yes    Site Marked:  Yes  Procedure details:     Procedure:  Pectoralis    ASA:  1    Diagnosis:  Reduction surgery    Laterality:  Bilateral    Position:  Supine    Sterile Prep: chloraprep, mask and sterile gloves      Local skin infiltration:  2% lidocaine    amount (mL):  4    Needle:  Short bevel    Needle gauge:  21    Needle length (inches):  4    Ultrasound: Yes      Ultrasound used to identify targeted nerve, plexus, or vascular structure and placed a needle adjacent to it      Permanent Image entered into patiient's record      Abnormal pain on injection: No      Blood Aspirated: No      Paresthesias:  No    Bleeding at site: No      Bolus via:  Needle    Infusion Method:  Single Shot    Complications:  None  Assessment/Narrative:     Injection made incrementally with aspirations every (mL):  5     Informed consent obtained.  All risks and benefits of the nerve block discussed with the patient.  All questions answered and all parties agreed with the plan.   Block was placed at the surgeon's request for post operative pain control.

## 2018-03-26 NOTE — ANESTHESIA POSTPROCEDURE EVALUATION
Patient: Rosa Maria Kelley    Procedure(s):  Bilateral Breast Reduction - Wound Class: I-Clean    Diagnosis:Breast Hypertrophy  Diagnosis Additional Information: No value filed.    Anesthesia Type:  General, LMA, Periph. Nerve Block for postop pain    Note:  Anesthesia Post Evaluation    Patient location during evaluation: Phase 2  Patient participation: Able to fully participate in evaluation  Level of consciousness: awake and alert  Pain management: adequate  Airway patency: patent  Cardiovascular status: acceptable  Respiratory status: acceptable  Hydration status: acceptable  PONV: none     Anesthetic complications: None          Last vitals:  Vitals:    03/26/18 1015 03/26/18 1029 03/26/18 1056   BP: 120/86 124/82 122/80   Resp: 8 12 12   Temp: 36.1  C (97  F) 36.1  C (97  F) 36.1  C (97  F)   SpO2: 98% 95%          Electronically Signed By: Nicola Headley DO  March 26, 2018  2:10 PM

## 2018-03-26 NOTE — IP AVS SNAPSHOT
"                  MRN:[id number]                      After Visit Summary   3/26/2018    Rosa Maria Kelley    MRN: [id number]           Thank you!     Thank you for choosing [facility] for your care. Our goal is always to provide you with excellent care. Hearing back from our patients is one way we can continue to improve our services. Please take a few minutes to complete the written survey that you may receive in the mail after you visit with us. Thank you!        Patient Information     Date Of Birth          1980        About your hospital stay     You were admitted on:  March 26, 2018 You last received care in the:  [facility]    You were discharged on:  March 26, 2018       Who to Call     For medical emergencies, please call 911.  For non-urgent questions about your medical care, please call your primary care provider or clinic, [phone]  For questions related to your surgery, please call your surgery clinic        Attending Provider     Provider Specialty    NUSRAT Rahman MD Plastic Surgery       Primary Care Provider Office Phone # Fax #    Trace Gurvinder Morgan [phone]      Your next 10 appointments already scheduled     Apr 03, 2018  7:15 AM CDT   (Arrive by 7:00 AM)   Post-Op with NUSRAT Rahman MD   [facility] ([facility])    [address] [place] [address]  [address] [place] [id number] [phone]              Further instructions from your care team       Information about liposomal bupivacaine (Exparel)    What is Liposomal Bupivacaine?    Liposomal Bupivacaine is a numbing medication that can help you manage your pain after surgery.  This medication is similar to \"novacaine,\" which is often used by the dentist.  Liposomal bupivacaine is released slowly and can help control pain for up to 72 hours.    What is the purpose of Liposomal Bupivacaine?    To manage your pain after surgery    To help you " sleep better, take deep breaths, walk more comfortable, and feel up to visiting with others    How is the procedure done?    Liposomal bupivacaine is a medication given by an injection.    It is usually given right before your surgery.  If this is the case, you will be awake or sedated, but you should experience minimal pain during the procedure.    For some people, the injection may be given at the very end of your surgery.  It all depends on the type of surgery and your situation.    The procedure usually takes about 5-15 minutes.  An ultrasound machine will help the anesthesiologist insert it in the right place or the surgeon will inject it under direct vision.     A needle is used to place the numbing medication under your skin.  It provides pain relief by numbing the tissue in the area where your surgeon will make the incision.    What can I expect?    You may experience numbness, tingling, or a feeling of heaviness around the area that was injected.    If you experience any of the follow symptoms IMMEDIATELY CALL THE REGIONAL ANESTHESIA PAIN SERVICE:    Numbness or tingling occurs in areas other than around the injection site    Blurry vision    Ringing in your ears    A metallic taste in your mouth    PAGE: Dial [phone].  When prompted, enter the following 4-digit ID number:  [id number].  You will be prompted to enter your phone number; and then enter the # sign.  The clinician on call will call you back.    OR  CALL: Dial [phone].  Let the hospital  know that you are having a problem with a nerve block and that you would like to speak to the regional anesthesia pain service right away.    You should not receive any other type of numbing medication within 4 days after receiving liposomal bupivacaine unless your anesthesiologist approves.    Post Operative Instructions: Regional Anesthetic with Liposomal Bupivacaine for Chest and Abdominal Surgery  General Information:   Regional anesthesia is when  local anesthetic or  numbing  medication is injected around the nerves to anesthetize or  numb  the area supplied by that set of nerves.     Types of Regional Blocks:  Transversus Abdominis Plane (TAP): A block injected beneath the covering of a muscle layer of the abdomen for abdominal surgery  Pectoral: A block injected near the breast for surgery on the breast and armpit  Paravertebral: A block injected in the back for surgery on the chest, ribs, and breast    Procedure:  The type of anesthesia your doctor used to numb your chest or abdomen will usually not wear off for 24-48 hours, but may last as long as 72 hours.     Diet:  There are no restrictions on your diet. You should drink plenty of fluids.     Discomfort:  You will have a tingling and prickly sensation in your chest or abdomen as the feeling begins to return. You can also expect some discomfort. The amount of discomfort is unpredictable, but if you have more pain than can be controlled with pain medication you should notify your physician.     Pain Medicine:   Begin taking your oral pain pills before bedtime and during the night to avoid a sudden onset of pain as part of the block wears off.  Do not engage in drinking, driving, or hazardous occupations while taking pain medication.     Stitches:   You may have stitches or special skin closures. You doctor will inform you when to return to the office to have them removed. [facility]  Home Care Following Anesthesia  For 24 hours after surgery:  1. Get plenty of rest.  A responsible adult must stay with you for at least 24 hours after you leave the surgery center.  2. Do not drive or use heavy equipment.  If you have weakness or tingling, don't drive or use heavy equipment until this feeling goes away.   3. Do not drink alcohol.   4. Avoid strenuous or risky activities.  Ask for help when climbing stairs.  5. You may feel lightheaded.  IF so, sit for a few minutes  before standing.  Have someone help you get up.   6. If you have nausea (feel sick to your stomach): Drink only clear liquids such as apple juice, ginger ale, broth or 7-Up.  Rest may also help.  Be sure to drink enough fluids.  Move to a regular diet as you feel able.   7. You may have a slight fever.  Call the doctor if your fever is over 100 F (37.7 C) (taken under the tongue) or lasts longer than 24 hours.  8. You may have a dry mouth, a sore throat, muscle aches or trouble sleeping. These should go away after 24 hours.  9. Do not make important or legal decisions.            Tips for taking pain medications  To get the best pain relief possible, remember these points:    Take pain medications as directed, before pain becomes severe.    Pain medication can upset your stomach: taking it with food may help.    Constipation is a common side effect of pain medication. Drink plenty of  fluids.    Eat foods high in fiber. Take a stool softener if recommended by your doctor or pharmacist.    Do not drink alcohol, drive or operate machinery while taking pain medications.    Ask about other ways to control pain, such as with heat, ice or relaxation.    Tylenol/Acetaminophen Consumption  To help encourage the safe use of acetaminophen, the makers of TYLENOL  have lowered the maximum daily dose for single-ingredient Extra Strength TYLENOL  (acetaminophen) products sold in the [place] from 8 pills per day (4,000 mg) to 6 pills per day (3,000 mg). The dosing interval has also changed from 2 pills every 4-6 hours to 2 pills every 6 hours.    If you feel your pain relief is insufficient, you may take Tylenol/Acetaminophen in addition to your narcotic pain medication.     Be careful not to exceed 3,000 mg of Tylenol/Acetaminophen in a 24 hour period from all sources.    If you are taking extra strength Tylenol/acetaminophen (500 mg), the maximum dose is 6 tablets in 24 hours.    If you are taking regular strength acetaminophen  (325 mg), the maximum dose is 9 tablets in 24 hours.    Call a doctor for any of the followin. Signs of infection (fever, growing tenderness at the surgery site, a large amount of drainage or bleeding, severe pain, foul-smelling drainage, redness, swelling).  2. It has been over 8 to 10 hours since surgery and you are still not able to urinate (pass water).  3. Headache for over 24 hours.  4. Numbness, tingling or weakness the day after surgery (if you had spinal anesthesia).  Your doctor is:  Dr. Aylin Rahman, Plastic Surgery: [phone]                      For emergency care, call the:  [facility]:  [phone] (TTY for hearing impaired: [phone])            BREAST REDUCTION POST-OPERATIVE INSTRUCTIONS    Instructions       Have someone drive you home after surgery and help you at home for 1-2      days.      Get plenty of rest.      Follow balanced diet.      Decreased activity may promote constipation, so you may want to add      more raw fruit to your diet, and be sure to increase fluid intake. Your doctor       may also order a stool softener.      Take pain medication as prescribed. Do not take aspirin or any products      containing aspirin.      Do not drink alcohol when taking pain medications.      Even when not taking pain medications, no alcohol for 3 weeks as it      causes fluid retention.      If you are taking vitamins with iron, resume these as tolerated.      Do not smoke, as smoking delays healing and increases the risk of      complications.    Activities      Do not drive fpr 10 days following your procedure and until you are no longer taking        any pain medications (narcotics).      Do not drive until you have full range of motion with your arms and can stop the car       or swerve in an emergency.      Start walking the evening of surgery, this helps to reduce swelling and       lowers the chance of blood clots.      Refrain from vigorous activities for  2-6 weeks.  Increase activity gradually as tolerated.      After 2 weeks, you may perform lower body exercise, but must wait the full 6 weeks       prior to performing upper body exercise      Avoid lifting anything over 5 pounds for 2 weeks.      Resume social and employment activities in about 2 weeks (if not too strenuous).    Incision Care      You may shower in 48 hours.  If drainage tubes have been used, you may shower       48 hours after removal of the drains. The ACE wrap (if used) may be rewrapped as needed (if too tight or loose). Use it for support and may subtitute with a sports bra if preferred.       Avoid exposing scars to sun for at least 12 months.      Always use a strong sunblock, if sun exposure is unavoidable (SPF 50 or      greater).      Keep the tape on until it starts to curl up on the ends, and then gently remove them.        You may use moisturizing cream at 10 days to help the tape come off. By two weeks,      you may pull off the tape if still in place.      Wear your surgical bra/wrap 24/7 as directed for 4 weeks.      Avoid bras with stays and underwires for 4-6 weeks.      You may pad the incisions with gauze for comfort (panty liners also work well as they        are absorbent and inexpensive).      Inspect daily for signs of infection.      No tub soaking, bathing, or swimming until wounds are healed.      If your breast skin is dry after surgery, you can apply a moisturizer several times a       day.     What to Expect      Despite the three layers of sutures closing your incisions, there will be some oozing       of tissue fluid from them for 2 days or so.  This will soak up on the gauze and the bra       to look like more than it really is.  Report any significant drainage to the clinic.      Most of the higher discomfort will subside after the first few days.      You may experience temporary soreness, bruising, swelling and tightnessin the       breasts as well as discomfort  "in the incision area.      You may have have normal sensation in the nipples.  This may be more or less than       usual, and usually returns over a couple of months.      Your first menstruation following surgery may cause your breasts to swell and hurt.      You may have random shooting pains, tingling, or other strange sensations in the            skin for a few months.  These will subside.    Appearance      Most of the discoloration and swelling will subside in 2-4 weeks.      Your breasts will feel firm to the touch initially, but will soften with time.      A more natural shape will occur as the breasts \"settle\" in a slightly lower position over     the first few months.      Scars may be red and thick for 6-12 months (longer in lighter-skinned patients).  IN          time, these usually soften and fade.    Follow-Up Care      Typically, you will have a post op check at 1-2 weeks, and again with your surgeon in      another month.      If drainage tubes have been used, will be removed when the drainage is less than 30      ml per day for 1-2 days.  This usually happens in 1-3 weeks.    When to Call      If you have increased swelling or bruising, particular one side greater than the other.      If swelling and redness persist after a few days.      If you have increased redness along the incision.      If you have severe or increased pain not relieved by medication.      If you have any side effects to medications; such as, rash, nausea,      headache, vomiting, or constipation.      If you have an oral temperature over 100.4 degrees.      If you have any yellowish or greenish drainage from the incisions or      notice a foul odor.      If you have bleeding from the incisions that is difficult to control with      light pressure.      If you have loss of feeling or motion.      Any unanswered concern.    For Medical Questions, Please Call:      [phone], Monday - Friday, 8 a.m. - 4:30 p.m.      After " "hours and on weekends, call Hospital Paging at [phone] and      ask for the Plastic Surgeon on call.      Pending Results     No orders found from 3/24/2018 to 3/27/2018.            Admission Information     Date & Time Provider Department Dept. Phone    3/26/2018 NUSRAT Rahman MD [facility] [phone]      Your Vitals Were     Blood Pressure Temperature Respirations Height Weight Pulse Oximetry    120/73 (Cuff Size: Adult Regular) 98.5  F (36.9  C) (Oral) 14 1.676 m (5' 6\") 80.7 kg (178 lb) 99%    BMI (Body Mass Index)                   28.73 kg/m2           [employer] Information     Noquo is an electronic gateway that provides easy, online access to your medical records. With Noquo, you can request a clinic appointment, read your test results, renew a prescription or communicate with your care team.     To sign up for Noquo visit the website at [email or web address]   You will be asked to enter the access code listed below, as well as some personal information. Please follow the directions to create your username and password.     Your access code is: [email or web address]  Expires: 2018  7:30 AM     Your access code will  in 90 days. If you need help or a new code, please contact your [facility] or call [phone] for assistance.        Care EveryWhere ID     This is your Care EveryWhere ID. This could be used by other organizations to access your [facility] medical records  [id number]        Equal Access to Services     LATRICIA MCLEOD : Hadii barrera guzman hadasho Sosethali, waaxda luqadaha, qaybta kaalmada cristal, tali idiin hayaan adeeg kharash la'aan . So [facility] [phone].    ATENCIÓN: Si habla español, tiene a montejo disposición servicios gratuitos de asistencia lingüística. Llame al [phone].    We comply with applicable federal civil rights laws and [place] laws. We do not discriminate on the basis of race, color, national " origin, age, disability, sex, sexual orientation, or gender identity.               Review of your medicines      START taking        Dose / Directions    ondansetron 4 MG ODT tab   Commonly known as:  ZOFRAN-ODT   Used for:  S/P bilateral breast reduction        Dose:  4 mg   Take 1 tablet (4 mg) by mouth every 6 hours as needed for nausea   Quantity:  8 tablet   Refills:  0       oxyCODONE IR 5 MG tablet   Commonly known as:  ROXICODONE   Used for:  S/P bilateral breast reduction        Dose:  5-10 mg   Take 1-2 tablets (5-10 mg) by mouth every 6 hours as needed for other (Moderate to Severe Pain)   Quantity:  30 tablet   Refills:  0       senna-docusate 8.6-50 MG per tablet   Commonly known as:  SENOKOT-S;PERICOLACE   Used for:  S/P bilateral breast reduction        Dose:  1-2 tablet   Take 1-2 tablets by mouth 2 times daily   Quantity:  30 tablet   Refills:  0            Where to get your medicines      These medications were sent to [address] [address]    Hours:  TRANSPLANT PHONE NUMBER [phone] Phone:  [phone]     ondansetron 4 MG ODT tab    senna-docusate 8.6-50 MG per tablet         Some of these will need a paper prescription and others can be bought over the counter. Ask your nurse if you have questions.     Bring a paper prescription for each of these medications     oxyCODONE IR 5 MG tablet                Protect others around you: Learn how to safely use, store and throw away your medicines at www.disposemymeds.org.        Information about OPIOIDS     PRESCRIPTION OPIOIDS: WHAT YOU NEED TO KNOW    Prescription opioids can be used to help relieve moderate to severe pain and are often prescribed following a surgery or injury, or for certain health conditions. These medications can be an important part of treatment but also come with serious risks. It is important to work with your  health care provider to make sure you are getting the safest, most effective care.    WHAT ARE THE RISKS AND SIDE EFFECTS OF OPIOID USE?  Prescription opioids carry serious risks of addiction and overdose, especially with prolonged use. An opioid overdose, often marked by slowed breathing can cause sudden death. The use of prescription opioids can have a number of side effects as well, even when taken as directed:      Tolerance - meaning you might need to take more of a medication for the same pain relief    Physical dependence - meaning you have symptoms of withdrawal when a medication is stopped    Increased sensitivity to pain    Constipation    Nausea, vomiting, and dry mouth    Sleepiness and dizziness    Confusion    Depression    Low levels of testosterone that can result in lower sex drive, energy, and strength    Itching and sweating    RISKS ARE GREATER WITH:    History of drug misuse, substance use disorder, or overdose    Mental health conditions (such as depression or anxiety)    Sleep apnea    Older age (65 years or older)    Pregnancy    Avoid alcohol while taking prescription opioids.   Also, unless specifically advised by your health care provider, medications to avoid include:    Benzodiazepines (such as Xanax or Valium)    Muscle relaxants (such as Soma or Flexeril)    Hypnotics (such as Ambien or Lunesta)    Other prescription opioids    KNOW YOUR OPTIONS:  Talk to your health care provider about ways to manage your pain that do not involve prescription opioids. Some of these options may actually work better and have fewer risks and side effects:    Pain relievers such as acetaminophen, ibuprofen, and naproxen    Some medications that are also used for depression or seizures    Physical therapy and exercise    Cognitive behavioral therapy, a psychological, goal-directed approach, in which patients learn how to modify physical, behavioral, and emotional triggers of pain and stress    IF YOU ARE  PRESCRIBED OPIOIDS FOR PAIN:    Never take opioids in greater amounts or more often than prescribed    Follow up with your primary health care provider and work together to create a plan on how to manage your pain.    Talk about ways to help manage your pain that do not involve prescription opioids    Talk about all concerns and side effects    Help prevent misuse and abuse    Never sell or share prescription opioids    Never use another person's prescription opioids    Store prescription opioids in a secure place and out of reach of others (this may include visitors, children, friends, and family)    Visit www.cdc.gov/drugoverdose to learn about risks of opioid abuse and overdose    If you believe you may be struggling with addiction, tell your health care provider and ask for guidance or call [facility]'s National Helpline at 3-253-550-HELP    LEARN MORE / www.cdc.gov/drugoverdose/prescribing/guideline.html    Safely dispose of unused prescription opioids: Find your local drug take-back programs and more information about the importance of safe disposal at www.doseofreality.mn.gov             Medication List: This is a list of all your medications and when to take them. Check marks below indicate your daily home schedule. Keep this list as a reference.      Medications           Morning Afternoon Evening Bedtime As Needed    ondansetron 4 MG ODT tab   Commonly known as:  ZOFRAN-ODT   Take 1 tablet (4 mg) by mouth every 6 hours as needed for nausea                                oxyCODONE IR 5 MG tablet   Commonly known as:  ROXICODONE   Take 1-2 tablets (5-10 mg) by mouth every 6 hours as needed for other (Moderate to Severe Pain)                                senna-docusate 8.6-50 MG per tablet   Commonly known as:  SENOKOT-S;PERICOLACE   Take 1-2 tablets by mouth 2 times daily

## 2018-03-26 NOTE — ANESTHESIA PREPROCEDURE EVALUATION
Anesthesia Evaluation     . Pt has had prior anesthetic.     No history of anesthetic complications          ROS/MED HX    ENT/Pulmonary:  - neg pulmonary ROS    (-) tobacco use   Neurologic:  - neg neurologic ROS     Cardiovascular:  - neg cardiovascular ROS   (+) ----. : . . . :. . No previous cardiac testing       METS/Exercise Tolerance:  >4 METS   Hematologic:  - neg hematologic  ROS       Musculoskeletal:  - neg musculoskeletal ROS       GI/Hepatic:  - neg GI/hepatic ROS       Renal/Genitourinary:  - ROS Renal section negative       Endo:  - neg endo ROS       Psychiatric:  - neg psychiatric ROS       Infectious Disease:  - neg infectious disease ROS       Malignancy:      - no malignancy   Other: Comment: Hypertrophy of breast                    Physical Exam  Normal systems: cardiovascular, pulmonary and dental    Airway   Mallampati: I  TM distance: >3 FB  Neck ROM: full    Dental     Cardiovascular   Rhythm and rate: regular and normal      Pulmonary    breath sounds clear to auscultation                    Anesthesia Plan      History & Physical Review  History and physical reviewed and following examination; no interval change.    ASA Status:  1 .    NPO Status:  > 8 hours    Plan for General, LMA and Periph. Nerve Block for postop pain with Intravenous and Propofol induction. Maintenance will be TIVA.    PONV prophylaxis:  Ondansetron (or other 5HT-3) and Dexamethasone or Solumedrol       Postoperative Care  Postoperative pain management:  Multi-modal analgesia and Peripheral nerve block (Single Shot).      Consents  Anesthetic plan, risks, benefits and alternatives discussed with:  Patient.  Use of blood products discussed: No .   .                          .

## 2018-03-26 NOTE — ANESTHESIA CARE TRANSFER NOTE
Patient: Rosa Maria Kelley    Procedure(s):  Bilateral Breast Reduction - Wound Class: I-Clean    Diagnosis: Breast Hypertrophy  Diagnosis Additional Information: No value filed.    Anesthesia Type:   General, LMA, Periph. Nerve Block for postop pain     Note:  Airway :Nasal Cannula  Patient transferred to:PACU  Comments: To pacu report to RN    128/88, 16, 97.7, 76, 98%Handoff Report: Identifed the Patient, Identified the Reponsible Provider, Reviewed the pertinent medical history, Discussed the surgical course, Reviewed Intra-OP anesthesia mangement and issues during anesthesia, Set expectations for post-procedure period and Allowed opportunity for questions and acknowledgement of understanding      Vitals: (Last set prior to Anesthesia Care Transfer)    CRNA VITALS  3/26/2018 0915 - 3/26/2018 0950      3/26/2018             Pulse: 85    SpO2: 96 %    Resp Rate (observed): (!)  3    Resp Rate (set): 10                Electronically Signed By: DECLAN Boss CRNA  March 26, 2018  9:50 AM

## 2018-03-26 NOTE — IP AVS SNAPSHOT
[facility]    [address] [place] [address]    Phone:  [phone]    Fax:  [phone]                                       After Visit Summary   3/26/2018    Rosa Maria Kelley    MRN: [id number]           After Visit Summary Signature Page     I have received my discharge instructions, and my questions have been answered. I have discussed any challenges I see with this plan with the nurse or doctor.    ..........................................................................................................................................  Patient/Patient Representative Signature      ..........................................................................................................................................  Patient Representative Print Name and Relationship to Patient    ..................................................               ................................................  Date                                            Time    ..........................................................................................................................................  Reviewed by Signature/Title    ...................................................              ..............................................  Date                                                            Time

## 2018-03-26 NOTE — BRIEF OP NOTE
[facility]    Brief Operative Note    Pre-operative diagnosis: Breast Hypertrophy  Post-operative diagnosis * No post-op diagnosis entered *  Procedure: Procedure(s):  Bilateral Breast Reduction - Wound Class: I-Clean  Surgeon: Surgeon(s) and Role:     * NUSRAT Rahman MD - Primary  Anesthesia: Combined General with Block   Estimated blood loss: Less than 50 ml  Drains: None  Specimens:   ID Type Source Tests Collected by Time Destination   A : 509g Tissue Breast, Left SURGICAL PATHOLOGY EXAM NUSRAT Rahman MD 3/26/2018  7:04 AM    B : 577g Tissue Breast, Right SURGICAL PATHOLOGY EXAM NUSRAT Rahman MD 3/26/2018  7:04 AM      Findings:   None.  Complications: None.  Implants: None.

## 2018-03-28 LAB — COPATH REPORT: NORMAL

## 2018-04-03 ENCOUNTER — OFFICE VISIT (OUTPATIENT)
Dept: PLASTIC SURGERY | Facility: CLINIC | Age: 38
End: 2018-04-03
Attending: PLASTIC SURGERY
Payer: COMMERCIAL

## 2018-04-03 VITALS — HEIGHT: 66 IN | OXYGEN SATURATION: 98 % | BODY MASS INDEX: 28.96 KG/M2 | WEIGHT: 180.19 LBS | RESPIRATION RATE: 16 BRPM

## 2018-04-03 DIAGNOSIS — N62 HYPERTROPHY OF BREAST: Primary | ICD-10-CM

## 2018-04-03 PROCEDURE — G0463 HOSPITAL OUTPT CLINIC VISIT: HCPCS | Mod: ZF

## 2018-04-03 ASSESSMENT — PAIN SCALES - GENERAL: PAINLEVEL: NO PAIN (0)

## 2018-04-03 NOTE — MR AVS SNAPSHOT
"              After Visit Summary   4/3/2018    Rosa Maria Kelley    MRN: [id number]           Patient Information     Date Of Birth          1980        Visit Information        Provider Department      4/3/2018 8:00 AM NUSRAT Rahman MD [facility]        Today's Diagnoses     Hypertrophy of breast    -  1       Follow-ups after your visit        Follow-up notes from your care team     Return in about 4 weeks (around 5/1/2018).      Your next 10 appointments already scheduled     May 01, 2018  8:30 AM CDT   (Arrive by 8:15 AM)   Return Visit with NUSRAT Rahman MD   [facility] ([facility])    [address]  [address]  [place] [address] [phone]              Who to contact     If you have questions or need follow up information about today's clinic visit or your schedule please contact [facility] directly at [phone].  Normal or non-critical lab and imaging results will be communicated to you by MyChart, letter or phone within 4 business days after the clinic has received the results. If you do not hear from us within 7 days, please contact the clinic through [employer] or phone. If you have a critical or abnormal lab result, we will notify you by phone as soon as possible.  Submit refill requests through [employer] or call your pharmacy and they will forward the refill request to us. Please allow 3 business days for your refill to be completed.          Additional Information About Your Visit        [employer] Information     [employer] lets you send messages to your doctor, view your test results, renew your prescriptions, schedule appointments and more. To sign up, go to [email or web address] . Click on \"Log in\" on the left side of the screen, which will take you to the Welcome page. Then click on \"Sign up Now\" on the right side of the page.     You will be asked to enter the access code listed below, as well as some " "personal information. Please follow the directions to create your username and password.     Your access code is: [email or web address]  Expires: 2018  7:30 AM     Your access code will  in 90 days. If you need help or a new code, please call your [place] clinic or [phone].        Care EveryWhere ID     This is your Care EveryWhere ID. This could be used by other organizations to access your [place] medical records  [id number]        Your Vitals Were     Respirations Height Pulse Oximetry BMI (Body Mass Index)          16 1.676 m (5' 6\") 98% 29.08 kg/m2         Blood Pressure from Last 3 Encounters:   18 (P) 112/78   18 122/80   18 (!) 142/92    Weight from Last 3 Encounters:   18 81.7 kg (180 lb 3 oz)   18 80.7 kg (178 lb)   18 81.9 kg (180 lb 8 oz)              Today, you had the following     No orders found for display       Primary Care Provider Office Phone # Fax #    Tracemiguel Morgan [phone]       [place] [address]        Equal Access to Services     LATRICIA MCLEOD : Hadii barrera boyero Sosethali, waaxda luqadaha, qaybta kaalmada adeegyada, tali winter. So [facility] [phone].    ATENCIÓN: Si habla español, tiene a montejo disposición servicios gratuitos de asistencia lingüística. Llame al [phone].    We comply with applicable federal civil rights laws and [place] laws. We do not discriminate on the basis of race, color, national origin, age, disability, sex, sexual orientation, or gender identity.            Thank you!     Thank you for choosing [facility]  for your care. Our goal is always to provide you with excellent care. Hearing back from our patients is one way we can continue to improve our services. Please take a few minutes to complete the written survey that you may receive in the mail after your visit with us. Thank you!             Your " Updated Medication List - Protect others around you: Learn how to safely use, store and throw away your medicines at www.disposemymeds.org.          This list is accurate as of 4/3/18  8:31 AM.  Always use your most recent med list.                   Brand Name Dispense Instructions for use Diagnosis    ondansetron 4 MG ODT tab    ZOFRAN-ODT    8 tablet    Take 1 tablet (4 mg) by mouth every 6 hours as needed for nausea    S/P bilateral breast reduction       oxyCODONE IR 5 MG tablet    ROXICODONE    30 tablet    Take 1-2 tablets (5-10 mg) by mouth every 6 hours as needed for other (Moderate to Severe Pain)    S/P bilateral breast reduction       senna-docusate 8.6-50 MG per tablet    SENOKOT-S;PERICOLACE    30 tablet    Take 1-2 tablets by mouth 2 times daily    S/P bilateral breast reduction

## 2018-04-03 NOTE — LETTER
4/3/2018       RE: Rosa Maria Kelley  [address]  [facility] [address]     Dear Colleague,    Thank you for referring your patient, Rosa Maria Kelley, to the [facility] at [facility]. Please see a copy of my visit note below.    PRESENTING COMPLAINT:  Postoperative visit, status post bilateral breast reduction done on 03/26/2018.      HISTORY OF PRESENT ILLNESS:  Ms. Kelley is 38 years old.  She is about a week out from surgery, done very well, no major issues.  Pathology was all benign.      PHYSICAL EXAMINATION:   VITAL SIGNS:  Stable.  She is afebrile, in no obvious distress.   BREASTS:  Both breasts are healing in well.  They are aesthetic.  Her left breast is slightly more swollen and bruised than the right side.  Nipples are pink.  The breasts are soft but more swollen on the left.      ASSESSMENT AND PLAN:  Based on above findings, a diagnosis of bilateral breast reduction was made.  I have asked her to start moisturizing her incisions and allow everything to just settle over the next 4-6 weeks.  I have advised her to refrain from any heavy activities for 2-3 more weeks.  All questions were answered.  All exam was done in the presence of my nurse.  I will see her back in 3 weeks.         Again, thank you for allowing me to participate in the care of your patient.      Sincerely,    NUSART Rahman MD

## 2018-04-03 NOTE — PROGRESS NOTES
PRESENTING COMPLAINT:  Postoperative visit, status post bilateral breast reduction done on 03/26/2018.      HISTORY OF PRESENT ILLNESS:  Ms. Kelley is 38 years old.  She is about a week out from surgery, done very well, no major issues.  Pathology was all benign.      PHYSICAL EXAMINATION:   VITAL SIGNS:  Stable.  She is afebrile, in no obvious distress.   BREASTS:  Both breasts are healing in well.  They are aesthetic.  Her left breast is slightly more swollen and bruised than the right side.  Nipples are pink.  The breasts are soft but more swollen on the left.      ASSESSMENT AND PLAN:  Based on above findings, a diagnosis of bilateral breast reduction was made.  I have asked her to start moisturizing her incisions and allow everything to just settle over the next 4-6 weeks.  I have advised her to refrain from any heavy activities for 2-3 more weeks.  All questions were answered.  All exam was done in the presence of my nurse.  I will see her back in 3 weeks.

## 2018-04-10 ENCOUNTER — OFFICE VISIT (OUTPATIENT)
Dept: PLASTIC SURGERY | Facility: CLINIC | Age: 38
End: 2018-04-10
Attending: PLASTIC SURGERY
Payer: COMMERCIAL

## 2018-04-10 ENCOUNTER — RADIANT APPOINTMENT (OUTPATIENT)
Dept: MAMMOGRAPHY | Facility: CLINIC | Age: 38
End: 2018-04-10
Attending: PLASTIC SURGERY
Payer: COMMERCIAL

## 2018-04-10 DIAGNOSIS — N63.21 BREAST LUMP ON LEFT SIDE AT 1 O'CLOCK POSITION: ICD-10-CM

## 2018-04-10 DIAGNOSIS — Z98.890 S/P BILATERAL BREAST REDUCTION: Primary | ICD-10-CM

## 2018-04-10 RX ORDER — LIDOCAINE HYDROCHLORIDE 10 MG/ML
10 INJECTION, SOLUTION EPIDURAL; INFILTRATION; INTRACAUDAL; PERINEURAL ONCE
Status: COMPLETED | OUTPATIENT
Start: 2018-04-10 | End: 2018-04-10

## 2018-04-10 RX ADMIN — LIDOCAINE HYDROCHLORIDE 10 ML: 10 INJECTION, SOLUTION EPIDURAL; INFILTRATION; INTRACAUDAL; PERINEURAL at 09:44

## 2018-04-10 NOTE — LETTER
4/10/2018       RE: Rosa Maria Kelley  [address]  [facility] [address]     Dear Colleague,    Thank you for referring your patient, Rosa Maria Kelley, to the [facility] at [facility]. Please see a copy of my visit note below.    PRESENTING COMPLAINT:  Postoperative visit, status post bilateral breast reduction done on 03/26/2018.      HISTORY OF PRESENT ILLNESS:  Ms. Kelley is 38 years old.  She is about 2-1/2 weeks out from surgery complaining of more swelling in the left breast.  It has not been sudden, it has been slow and steady, no drainage, no fevers.      PHYSICAL EXAMINATION:  Vital signs stable.  She is afebrile, in no obvious distress.  Her right breast is healed.  Left breast is healing but has significant bruising and is definitely more swollen than last week and is firm to the touch.      ASSESSMENT AND PLAN:  Based on above findings, a diagnosis of bilateral breast reduction with left-sided possible hematoma was made.  I think this hematoma has been slow and progressive not a sudden arterial bleed.  I had her get an ultrasound today which showed a simple collection of fluid which was aspirated.  A total of about 250 mL of old bloody fluid was aspirated.  The breast became extremely soft thereafter.  I have asked the patient to start wrapping her chest, will see her back in a week's time and then plan the next steps accordingly.  All questions were answered.  She was happy with the visit.  All exam was done in the presence of my nurse.       Again, thank you for allowing me to participate in the care of your patient.      Sincerely,    NUSRAT Rahman MD

## 2018-04-10 NOTE — MR AVS SNAPSHOT
After Visit Summary   4/10/2018    Rosa Maria Kelley    MRN: [id number]           Patient Information     Date Of Birth          1980        Visit Information        Provider Department      4/10/2018 8:00 AM NUSRAT Rahman MD [facility]        Today's Diagnoses     S/P bilateral breast reduction    -  1       Follow-ups after your visit        Follow-up notes from your care team     Return in about 1 week (around 4/17/2018).      Your next 10 appointments already scheduled     Apr 17, 2018 11:00 AM CDT   (Arrive by 10:45 AM)   Return Visit with NUSRAT Rahman MD   [facility] ([facility])    [address] [place] [address]  [address]  [facility] [address] [phone]            May 01, 2018  8:30 AM CDT   (Arrive by 8:15 AM)   Return Visit with NUSRAT Rahman MD   [facility] ([facility])    [address] [place] [address]  [address]  [facility] [address] [phone]              Who to contact     If you have questions or need follow up information about today's clinic visit or your schedule please contact [facility] directly at [phone].  Normal or non-critical lab and imaging results will be communicated to you by [employer], letter or phone within 4 business days after the clinic has received the results. If you do not hear from us within 7 days, please contact the clinic through [employer] or phone. If you have a critical or abnormal lab result, we will notify you by phone as soon as possible.  Submit refill requests through [employer] or call your pharmacy and they will forward the refill request to us. Please allow 3 business days for your refill to be completed.          Additional Information About Your Visit        [employer] Information     [employer] lets you send messages to your doctor, view your test results, renew your prescriptions, schedule appointments and more. To sign up, go to  [email or web address] . Click on \"Log in\" on the left side of the screen, which will take you to the Welcome page. Then click on \"Sign up Now\" on the right side of the page.     You will be asked to enter the access code listed below, as well as some personal information. Please follow the directions to create your username and password.     Your access code is: [email or web address]  Expires: 2018  7:30 AM     Your access code will  in 90 days. If you need help or a new code, please call your [place] clinic or [phone].        Care EveryWhere ID     This is your Care EveryWhere ID. This could be used by other organizations to access your [place] medical records  [id number]         Blood Pressure from Last 3 Encounters:   18 (P) 112/78   18 122/80   18 (!) 142/92    Weight from Last 3 Encounters:   18 180 lb 3 oz   18 178 lb   18 180 lb 8 oz              We Performed the Following     [facility]        Primary Care Provider Office Phone # Fax #    Trace Gurvinder Morgan [phone]       [facility] [place] [address]        Equal Access to Services     LATRICIA MCLEOD AH: Hadii aad ku hadasho Songoc, waaxda luqadaha, qaybta kaalmada adeegyada, tali cruz . So [facility] [phone].    ATENCIÓN: Si habla español, tiene a montejo disposición servicios gratuitos de asistencia lingüística. Llame al [phone].    We comply with applicable federal civil rights laws and [place] laws. We do not discriminate on the basis of race, color, national origin, age, disability, sex, sexual orientation, or gender identity.            Thank you!     Thank you for choosing [facility]  for your care. Our goal is always to provide you with excellent care. Hearing back from our patients is one way we can continue to improve our services. Please take a few minutes to complete the written survey that you " may receive in the mail after your visit with us. Thank you!             Your Updated Medication List - Protect others around you: Learn how to safely use, store and throw away your medicines at www.disposemymeds.org.          This list is accurate as of 4/10/18  6:00 PM.  Always use your most recent med list.                   Brand Name Dispense Instructions for use Diagnosis    ondansetron 4 MG ODT tab    ZOFRAN-ODT    8 tablet    Take 1 tablet (4 mg) by mouth every 6 hours as needed for nausea    S/P bilateral breast reduction       oxyCODONE IR 5 MG tablet    ROXICODONE    30 tablet    Take 1-2 tablets (5-10 mg) by mouth every 6 hours as needed for other (Moderate to Severe Pain)    S/P bilateral breast reduction       senna-docusate 8.6-50 MG per tablet    SENOKOT-S;PERICOLACE    30 tablet    Take 1-2 tablets by mouth 2 times daily    S/P bilateral breast reduction

## 2018-04-10 NOTE — PROGRESS NOTES
PRESENTING COMPLAINT:  Postoperative visit, status post bilateral breast reduction done on 03/26/2018.      HISTORY OF PRESENT ILLNESS:  Ms. Kelley is 38 years old.  She is about 2-1/2 weeks out from surgery complaining of more swelling in the left breast.  It has not been sudden, it has been slow and steady, no drainage, no fevers.      PHYSICAL EXAMINATION:  Vital signs stable.  She is afebrile, in no obvious distress.  Her right breast is healed.  Left breast is healing but has significant bruising and is definitely more swollen than last week and is firm to the touch.      ASSESSMENT AND PLAN:  Based on above findings, a diagnosis of bilateral breast reduction with left-sided possible hematoma was made.  I think this hematoma has been slow and progressive not a sudden arterial bleed.  I had her get an ultrasound today which showed a simple collection of fluid which was aspirated.  A total of about 250 mL of old bloody fluid was aspirated.  The breast became extremely soft thereafter.  I have asked the patient to start wrapping her chest, will see her back in a week's time and then plan the next steps accordingly.  All questions were answered.  She was happy with the visit.  All exam was done in the presence of my nurse.

## 2018-04-11 ENCOUNTER — CARE COORDINATION (OUTPATIENT)
Dept: PLASTIC SURGERY | Facility: CLINIC | Age: 38
End: 2018-04-11

## 2018-04-11 NOTE — PROGRESS NOTES
Contacted pt after fluid aspiration from left breast done yesterday.  She states the left breast remains smaller and does not appear to have more fluid collections.  Pt instructed to contact us if breast becomes larger again.  Has follow up appt with Dr. Rahman next Tuesday.

## 2018-04-17 ENCOUNTER — OFFICE VISIT (OUTPATIENT)
Dept: PLASTIC SURGERY | Facility: CLINIC | Age: 38
End: 2018-04-17
Attending: PLASTIC SURGERY
Payer: COMMERCIAL

## 2018-04-17 ENCOUNTER — HOSPITAL ENCOUNTER (OUTPATIENT)
Facility: CLINIC | Age: 38
End: 2018-04-17
Admitting: PLASTIC SURGERY
Payer: COMMERCIAL

## 2018-04-17 ENCOUNTER — TELEPHONE (OUTPATIENT)
Dept: INTERVENTIONAL RADIOLOGY/VASCULAR | Facility: CLINIC | Age: 38
End: 2018-04-17

## 2018-04-17 DIAGNOSIS — Z98.890 S/P BILATERAL BREAST REDUCTION: Primary | ICD-10-CM

## 2018-04-17 RX ORDER — LIDOCAINE 40 MG/G
CREAM TOPICAL
Status: CANCELLED | OUTPATIENT
Start: 2018-04-17

## 2018-04-17 RX ORDER — CEFAZOLIN SODIUM 2 G/100ML
2 INJECTION, SOLUTION INTRAVENOUS
Status: CANCELLED | OUTPATIENT
Start: 2018-04-17

## 2018-04-17 RX ORDER — SODIUM CHLORIDE 9 MG/ML
INJECTION, SOLUTION INTRAVENOUS CONTINUOUS
Status: CANCELLED | OUTPATIENT
Start: 2018-04-17

## 2018-04-17 NOTE — PROGRESS NOTES
PRESENTING COMPLAINT:  Postoperative visit, status post bilateral breast reduction done on 03/26/2018 with postoperative seroma.      HISTORY OF PRESENT ILLNESS:  Ms. Kelley is 38 years old.  She is now a week out from her aspiration which took out about 300-350 mL of serosanguineous fluid.  Post-aspiration, the breast was soft and patient did well.  She is now a week out, complaining of a little bit of redevelopment of fluid, less than before and less painful.  No fevers, no drainage.      PHYSICAL EXAMINATION:  Vital signs stable.  She is afebrile, in no obvious distress.  Her right breast is healed.  Left breast is healed, but has significant bruising and is slightly swollen and firm, consistent with a seroma.      ASSESSMENT AND PLAN:  Based on above findings, a diagnosis of bilateral breast reduction with left-sided old hematoma, seroma was made.  I went ahead and advised either continuous weekly followup and allow the fluid that has reaccumulated to absorb and let the bruise settle or place a drain in today or tomorrow.  She wants to place the drain.  We will schedule that.  All questions were answered.  I would like to see her back in a week's time.

## 2018-04-17 NOTE — MR AVS SNAPSHOT
After Visit Summary   4/17/2018    Rosa Maria Kelley    MRN: [id number]           Patient Information     Date Of Birth          1980        Visit Information        Provider Department      4/17/2018 9:00 AM NUSRAT Rahman MD [facility]        Today's Diagnoses     S/P bilateral breast reduction    -  1       Follow-ups after your visit        Follow-up notes from your care team     Return in about 1 week (around 4/24/2018), or work in, for Dr Rahman.      Your next 10 appointments already scheduled     Apr 25, 2018  3:30 PM CDT   Nurse Visit with  Oncology Nurse   [facility] ([facility])    [address] [place] [address]  [address]  [facility] [id number] [phone]            May 01, 2018  8:30 AM CDT   (Arrive by 8:15 AM)   Return Visit with NUSRAT Rahman MD   [facility] ([facility])    [address] [place] [address]  [address]  [facility] [id number] [phone]              Future tests that were ordered for you today     Open Future Orders        Priority Expected Expires Ordered    IR Skin Subq/Seroma Abscess Drain Routine  4/17/2019 4/17/2018    IR Consultation for IR Exam Routine  4/17/2019 4/17/2018            Who to contact     If you have questions or need follow up information about today's clinic visit or your schedule please contact [facility] directly at [phone].  Normal or non-critical lab and imaging results will be communicated to you by MyChart, letter or phone within 4 business days after the clinic has received the results. If you do not hear from us within 7 days, please contact the clinic through MyChart or phone. If you have a critical or abnormal lab result, we will notify you by phone as soon as possible.  Submit refill requests through [employer] or call your pharmacy and they will forward the refill request to us. Please allow 3 business days for your  "refill to be completed.          Additional Information About Your Visit        WeStudy.Inhart Information     ZUCHEM lets you send messages to your doctor, view your test results, renew your prescriptions, schedule appointments and more. To sign up, go to [email or web address] . Click on \"Log in\" on the left side of the screen, which will take you to the Welcome page. Then click on \"Sign up Now\" on the right side of the page.     You will be asked to enter the access code listed below, as well as some personal information. Please follow the directions to create your username and password.     Your access code is: [email or web address]  Expires: 2018  7:30 AM     Your access code will  in 90 days. If you need help or a new code, please call your [place] clinic or [phone].        Care EveryWhere ID     This is your Care EveryWhere ID. This could be used by other organizations to access your [place] medical records  [id number]         Blood Pressure from Last 3 Encounters:   18 (P) 112/78   18 122/80   18 (!) 142/92    Weight from Last 3 Encounters:   18 180 lb 3 oz   18 178 lb   18 180 lb 8 oz               Primary Care Provider Office Phone # Fax #    Trace Gurvinder Morgan [phone]       [place] [address]        Equal Access to Services     ERIK MCLEOD : Hadii barrera guzman hadasho Songoc, waaxda luqadaha, qaybta kaalmada tali bee . So [facility] [phone].    ATENCIÓN: Si habla chetanañol, tiene a montejo disposición servicios gratuitos de asistencia lingüística. Andresame al [phone].    We comply with applicable federal civil rights laws and [place] laws. We do not discriminate on the basis of race, color, national origin, age, disability, sex, sexual orientation, or gender identity.            Thank you!     Thank you for choosing [facility]  for your care. Our " goal is always to provide you with excellent care. Hearing back from our patients is one way we can continue to improve our services. Please take a few minutes to complete the written survey that you may receive in the mail after your visit with us. Thank you!             Your Updated Medication List - Protect others around you: Learn how to safely use, store and throw away your medicines at www.disposemymeds.org.          This list is accurate as of 4/17/18 11:05 AM.  Always use your most recent med list.                   Brand Name Dispense Instructions for use Diagnosis    ondansetron 4 MG ODT tab    ZOFRAN-ODT    8 tablet    Take 1 tablet (4 mg) by mouth every 6 hours as needed for nausea    S/P bilateral breast reduction       oxyCODONE IR 5 MG tablet    ROXICODONE    30 tablet    Take 1-2 tablets (5-10 mg) by mouth every 6 hours as needed for other (Moderate to Severe Pain)    S/P bilateral breast reduction       senna-docusate 8.6-50 MG per tablet    SENOKOT-S;PERICOLACE    30 tablet    Take 1-2 tablets by mouth 2 times daily    S/P bilateral breast reduction

## 2018-04-17 NOTE — LETTER
4/17/2018       RE: Rosa Maria Kelley  [address]  [facility] [address]     Dear Colleague,    Thank you for referring your patient, Rosa Maria Kelley, to the [facility] BREAST CENTER at [facility]. Please see a copy of my visit note below.    PRESENTING COMPLAINT:  Postoperative visit, status post bilateral breast reduction done on 03/26/2018 with postoperative seroma.      HISTORY OF PRESENT ILLNESS:  Ms. Kelley is 38 years old.  She is now a week out from her aspiration which took out about 300-350 mL of serosanguineous fluid.  Post-aspiration, the breast was soft and patient did well.  She is now a week out, complaining of a little bit of redevelopment of fluid, less than before and less painful.  No fevers, no drainage.      PHYSICAL EXAMINATION:  Vital signs stable.  She is afebrile, in no obvious distress.  Her right breast is healed.  Left breast is healed, but has significant bruising and is slightly swollen and firm, consistent with a seroma.      ASSESSMENT AND PLAN:  Based on above findings, a diagnosis of bilateral breast reduction with left-sided old hematoma, seroma was made.  I went ahead and advised either continuous weekly followup and allow the fluid that has reaccumulated to absorb and let the bruise settle or place a drain in today or tomorrow.  She wants to place the drain.  We will schedule that.  All questions were answered.  I would like to see her back in a week's time.         Again, thank you for allowing me to participate in the care of your patient.      Sincerely,    NUSRAT Rahman MD

## 2018-04-19 ENCOUNTER — TELEPHONE (OUTPATIENT)
Dept: INTERVENTIONAL RADIOLOGY/VASCULAR | Facility: CLINIC | Age: 38
End: 2018-04-19

## 2018-05-01 ENCOUNTER — OFFICE VISIT (OUTPATIENT)
Dept: PLASTIC SURGERY | Facility: CLINIC | Age: 38
End: 2018-05-01
Attending: PLASTIC SURGERY
Payer: COMMERCIAL

## 2018-05-01 DIAGNOSIS — Z98.890 S/P BILATERAL BREAST REDUCTION: Primary | ICD-10-CM

## 2018-05-01 NOTE — PROGRESS NOTES
PRESENTING COMPLAINT:  Postoperative visit, status post bilateral breast reduction done on 03/26/2018 with postoperative seroma.       HISTORY OF PRESENT ILLNESS:  Ms. Kelley is 38 years old. She is 5 weeks from surgery. Doing much better.       PHYSICAL EXAMINATION:  Vital signs stable.  She is afebrile, in no obvious distress.  Her right breast is healed.  Left breast is healed, minimal swelling, soft. Some firmness inferiorly. Much more symmetric. No obvious seroma.       ASSESSMENT AND PLAN:  Based on above findings, a diagnosis of bilateral breast reduction with left-sided old hematoma, seroma was made. Doing much better. Continue moisturization. RTC 6 weeks. All questions were answered.

## 2018-05-01 NOTE — LETTER
5/1/2018      RE: Rosa Maria Kelley  [address] [facility] [address]       PRESENTING COMPLAINT:  Postoperative visit, status post bilateral breast reduction done on 03/26/2018 with postoperative seroma.       HISTORY OF PRESENT ILLNESS:  Ms. Kelley is 38 years old. She is 5 weeks from surgery. Doing much better.       PHYSICAL EXAMINATION:  Vital signs stable.  She is afebrile, in no obvious distress.  Her right breast is healed.  Left breast is healed, minimal swelling, soft. Some firmness inferiorly. Much more symmetric. No obvious seroma.       ASSESSMENT AND PLAN:  Based on above findings, a diagnosis of bilateral breast reduction with left-sided old hematoma, seroma was made. Doing much better. Continue moisturization. RTC 6 weeks. All questions were answered.    NUSRAT Rahman MD

## 2018-05-01 NOTE — MR AVS SNAPSHOT
"              After Visit Summary   2018    Rosa Maria Kelley    MRN: [id number]           Patient Information     Date Of Birth          1980        Visit Information        Provider Department      2018 8:30 AM NUSRAT Rahman MD [facility]        Today's Diagnoses     S/P bilateral breast reduction    -  1       Follow-ups after your visit        Follow-up notes from your care team     Return in about 6 weeks (around 2018).      Who to contact     If you have questions or need follow up information about today's clinic visit or your schedule please contact [facility] directly at [phone].  Normal or non-critical lab and imaging results will be communicated to you by MyChart, letter or phone within 4 business days after the clinic has received the results. If you do not hear from us within 7 days, please contact the clinic through MyChart or phone. If you have a critical or abnormal lab result, we will notify you by phone as soon as possible.  Submit refill requests through Emergent[employer] or call your pharmacy and they will forward the refill request to us. Please allow 3 business days for your refill to be completed.          Additional Information About Your Visit        MyChart Information     EmergentDetection lets you send messages to your doctor, view your test results, renew your prescriptions, schedule appointments and more. To sign up, go to [email or web address] . Click on \"Log in\" on the left side of the screen, which will take you to the Welcome page. Then click on \"Sign up Now\" on the right side of the page.     You will be asked to enter the access code listed below, as well as some personal information. Please follow the directions to create your username and password.     Your access code is: [email or web address]  Expires: 2018  7:30 AM     Your access code will  in 90 days. If you need help or a new code, please call your [place] clinic or [phone].      "   Care EveryWhere ID     This is your Care EveryWhere ID. This could be used by other organizations to access your [place] medical records  [id number]         Blood Pressure from Last 3 Encounters:   04/03/18 (P) 112/78   03/26/18 122/80   03/20/18 (!) 142/92    Weight from Last 3 Encounters:   04/03/18 180 lb 3 oz   03/26/18 178 lb   03/20/18 180 lb 8 oz              Today, you had the following     No orders found for display       Primary Care Provider Office Phone # Fax #    Trace Morgan [phone]       [place] [address]        Equal Access to Services     LATRICIA MCLEOD : Tommy Ceballos, washannon tim, qaybta kaalmada cristal, tali winter. So [facility] [phone].    ATENCIÓN: Si habla español, tiene a montejo disposición servicios gratuitos de asistencia lingüística. Llame al [phone].    We comply with applicable federal civil rights laws and [place] laws. We do not discriminate on the basis of race, color, national origin, age, disability, sex, sexual orientation, or gender identity.            Thank you!     Thank you for choosing [facility]  for your care. Our goal is always to provide you with excellent care. Hearing back from our patients is one way we can continue to improve our services. Please take a few minutes to complete the written survey that you may receive in the mail after your visit with us. Thank you!             Your Updated Medication List - Protect others around you: Learn how to safely use, store and throw away your medicines at www.disposemymeds.org.          This list is accurate as of 5/1/18  8:39 AM.  Always use your most recent med list.                   Brand Name Dispense Instructions for use Diagnosis    ondansetron 4 MG ODT tab    ZOFRAN-ODT    8 tablet    Take 1 tablet (4 mg) by mouth every 6 hours as needed for nausea    S/P bilateral breast  reduction       oxyCODONE IR 5 MG tablet    ROXICODONE    30 tablet    Take 1-2 tablets (5-10 mg) by mouth every 6 hours as needed for other (Moderate to Severe Pain)    S/P bilateral breast reduction       senna-docusate 8.6-50 MG per tablet    SENOKOT-S;PERICOLACE    30 tablet    Take 1-2 tablets by mouth 2 times daily    S/P bilateral breast reduction

## 2018-05-01 NOTE — LETTER
5/1/2018       RE: Rosa Maria Kelley  [address]  [facility] [address]     Dear Colleague,    Thank you for referring your patient, Rosa Maria Kelley, to the [facility] BREAST CENTER at [facility]. Please see a copy of my visit note below.    PRESENTING COMPLAINT:  Postoperative visit, status post bilateral breast reduction done on 03/26/2018 with postoperative seroma.       HISTORY OF PRESENT ILLNESS:  Ms. Kelley is 38 years old. She is 5 weeks from surgery. Doing much better.       PHYSICAL EXAMINATION:  Vital signs stable.  She is afebrile, in no obvious distress.  Her right breast is healed.  Left breast is healed, minimal swelling, soft. Some firmness inferiorly. Much more symmetric. No obvious seroma.       ASSESSMENT AND PLAN:  Based on above findings, a diagnosis of bilateral breast reduction with left-sided old hematoma, seroma was made. Doing much better. Continue moisturization. RTC 6 weeks. All questions were answered.    Again, thank you for allowing me to participate in the care of your patient.      Sincerely,    NUSRAT Rahman MD

## 2018-05-07 ENCOUNTER — TELEPHONE (OUTPATIENT)
Dept: SURGERY | Facility: CLINIC | Age: 38
End: 2018-05-07

## 2018-07-10 ENCOUNTER — OFFICE VISIT (OUTPATIENT)
Dept: PLASTIC SURGERY | Facility: CLINIC | Age: 38
End: 2018-07-10
Attending: PLASTIC SURGERY
Payer: COMMERCIAL

## 2018-07-10 VITALS
WEIGHT: 171.1 LBS | RESPIRATION RATE: 16 BRPM | DIASTOLIC BLOOD PRESSURE: 91 MMHG | SYSTOLIC BLOOD PRESSURE: 135 MMHG | BODY MASS INDEX: 27.5 KG/M2 | HEART RATE: 59 BPM | HEIGHT: 66 IN | TEMPERATURE: 99.3 F | OXYGEN SATURATION: 97 %

## 2018-07-10 DIAGNOSIS — Z98.890 S/P BILATERAL BREAST REDUCTION: Primary | ICD-10-CM

## 2018-07-10 PROCEDURE — G0463 HOSPITAL OUTPT CLINIC VISIT: HCPCS | Mod: ZF

## 2018-07-10 ASSESSMENT — PAIN SCALES - GENERAL: PAINLEVEL: NO PAIN (0)

## 2018-07-10 NOTE — LETTER
7/10/2018     RE: Rosa Maria Kelley  [address]  [facility] [address]     Dear Colleague,    Thank you for referring your patient, Rosa Maria Kelley, to the [facility] BREAST CENTER at [facility]. Please see a copy of my visit note below.    PRESENTING COMPLAINT:  Postoperative visit, status post bilateral breast reduction done on 03/26/2018 with postoperative seroma.       HISTORY OF PRESENT ILLNESS:  Ms. Kelley is 38 years old. She is 3 months from surgery. Healed.       PHYSICAL EXAMINATION:  Vital signs stable.  She is afebrile, in no obvious distress.  Her right breast is healed.  Left breast is healed and symmetric. No obvious seroma.       ASSESSMENT AND PLAN:  Based on above findings, a diagnosis of bilateral breast reduction with left-sided old hematoma, seroma was made. Healed and happy. Continue moisturization. RTC prn. Advised to get to know her breasts well and continue yearly mammograms. Exam done with my nurse. All questions were answered.    Again, thank you for allowing me to participate in the care of your patient.      Sincerely,    NUSRAT Rahman MD

## 2018-07-10 NOTE — NURSING NOTE
"Oncology Rooming Note    July 10, 2018 8:01 AM   Rosa Maria Kelley is a 38 year old female who presents for:    Chief Complaint   Patient presents with     Oncology Clinic Visit     return - follow      Initial Vitals: BP (!) 135/91 (BP Location: Right arm, Patient Position: Chair, Cuff Size: Adult Regular)  Pulse 59  Temp 99.3  F (37.4  C) (Oral)  Resp 16  Ht 1.676 m (5' 5.98\")  Wt 77.6 kg (171 lb 1.6 oz)  SpO2 97%  BMI 27.63 kg/m2 Estimated body mass index is 27.63 kg/(m^2) as calculated from the following:    Height as of this encounter: 1.676 m (5' 5.98\").    Weight as of this encounter: 77.6 kg (171 lb 1.6 oz). Body surface area is 1.9 meters squared.  No Pain (0) Comment: Data Unavailable   No LMP recorded. Patient has had a hysterectomy.  Allergies reviewed: Yes  Medications reviewed: Yes    Medications: Medication refills not needed today.  Pharmacy name entered into EPIC:    [facility] [place], [place] - [address] [facility] [id number]  [facility] [address] [place] [address] - [address] AT [address]    Clinical concerns: No new concerns      6 minutes for nursing intake (face to face time)     Lary Juarez CMA              "

## 2018-07-10 NOTE — PROGRESS NOTES
PRESENTING COMPLAINT:  Postoperative visit, status post bilateral breast reduction done on 03/26/2018 with postoperative seroma.       HISTORY OF PRESENT ILLNESS:  Ms. Kelley is 38 years old. She is 3 months from surgery. Healed.       PHYSICAL EXAMINATION:  Vital signs stable.  She is afebrile, in no obvious distress.  Her right breast is healed.  Left breast is healed and symmetric. No obvious seroma.       ASSESSMENT AND PLAN:  Based on above findings, a diagnosis of bilateral breast reduction with left-sided old hematoma, seroma was made. Healed and happy. Continue moisturization. RTC prn. Advised to get to know her breasts well and continue yearly mammograms. Exam done with my nurse. All questions were answered.

## 2018-07-10 NOTE — MR AVS SNAPSHOT
"              After Visit Summary   7/10/2018    Rosa Maria Kelley    MRN: [id number]           Patient Information     Date Of Birth          1980        Visit Information        Provider Department      7/10/2018 8:00 AM NUSRAT Rahman MD [facility]        Today's Diagnoses     S/P bilateral breast reduction    -  1       Follow-ups after your visit        Follow-up notes from your care team     Return if symptoms worsen or fail to improve.      Who to contact     If you have questions or need follow up information about today's clinic visit or your schedule please contact [facility] directly at [phone].  Normal or non-critical lab and imaging results will be communicated to you by Geolab-IThart, letter or phone within 4 business days after the clinic has received the results. If you do not hear from us within 7 days, please contact the clinic through Geolab-IThart or phone. If you have a critical or abnormal lab result, we will notify you by phone as soon as possible.  Submit refill requests through [employer] or call your pharmacy and they will forward the refill request to us. Please allow 3 business days for your refill to be completed.          Additional Information About Your Visit        MyChart Information     [employer] lets you send messages to your doctor, view your test results, renew your prescriptions, schedule appointments and more. To sign up, go to [email or web address] . Click on \"Log in\" on the left side of the screen, which will take you to the Welcome page. Then click on \"Sign up Now\" on the right side of the page.     You will be asked to enter the access code listed below, as well as some personal information. Please follow the directions to create your username and password.     Your access code is: [email or web address]  Expires: 2018  6:30 AM     Your access code will  in 90 days. If you need help or a new code, please call your [place] clinic or [phone].   " "     Care EveryWhere ID     This is your Care EveryWhere ID. This could be used by other organizations to access your [place] medical records  [id number]        Your Vitals Were     Pulse Temperature Respirations Height Pulse Oximetry BMI (Body Mass Index)    59 99.3  F (37.4  C) (Oral) 16 5' 5.98\" 97% 27.63 kg/m2       Blood Pressure from Last 3 Encounters:   07/10/18 (!) 135/91   04/03/18 (P) 112/78   03/26/18 122/80    Weight from Last 3 Encounters:   07/10/18 171 lb 1.6 oz   04/03/18 180 lb 3 oz   03/26/18 178 lb              Today, you had the following     No orders found for display       Primary Care Provider Office Phone # Fax #    Trace Morgan [phone]       [place] [address]        Equal Access to Services     LATRICIA MCLEOD : Hadii aad ku hadasho Songoc, waaxda luqadaha, qaybta kaalmada adeanelyada, tali cruz . So [facility] [phone].    ATENCIÓN: Si habla español, tiene a montejo disposición servicios gratuitos de asistencia lingüística. Pradeep al [phone].    We comply with applicable federal civil rights laws and [place] laws. We do not discriminate on the basis of race, color, national origin, age, disability, sex, sexual orientation, or gender identity.            Thank you!     Thank you for choosing [facility]  for your care. Our goal is always to provide you with excellent care. Hearing back from our patients is one way we can continue to improve our services. Please take a few minutes to complete the written survey that you may receive in the mail after your visit with us. Thank you!             Your Updated Medication List - Protect others around you: Learn how to safely use, store and throw away your medicines at www.disposemymeds.org.          This list is accurate as of 7/10/18  8:13 AM.  Always use your most recent med list.                   Brand Name Dispense Instructions " for use Diagnosis    ondansetron 4 MG ODT tab    ZOFRAN-ODT    8 tablet    Take 1 tablet (4 mg) by mouth every 6 hours as needed for nausea    S/P bilateral breast reduction       oxyCODONE IR 5 MG tablet    ROXICODONE    30 tablet    Take 1-2 tablets (5-10 mg) by mouth every 6 hours as needed for other (Moderate to Severe Pain)    S/P bilateral breast reduction       senna-docusate 8.6-50 MG per tablet    SENOKOT-S;PERICOLACE    30 tablet    Take 1-2 tablets by mouth 2 times daily    S/P bilateral breast reduction

## 2021-04-25 ENCOUNTER — HEALTH MAINTENANCE LETTER (OUTPATIENT)
Age: 41
End: 2021-04-25

## 2021-06-02 ENCOUNTER — RECORDS - HEALTHEAST (OUTPATIENT)
Dept: ADMINISTRATIVE | Facility: CLINIC | Age: 41
End: 2021-06-02

## 2021-10-09 ENCOUNTER — HEALTH MAINTENANCE LETTER (OUTPATIENT)
Age: 41
End: 2021-10-09

## 2022-05-21 ENCOUNTER — HEALTH MAINTENANCE LETTER (OUTPATIENT)
Age: 42
End: 2022-05-21

## 2022-09-17 ENCOUNTER — HEALTH MAINTENANCE LETTER (OUTPATIENT)
Age: 42
End: 2022-09-17

## 2022-10-21 DIAGNOSIS — R53.83 FATIGUE: ICD-10-CM

## 2022-10-21 DIAGNOSIS — N95.1 MENOPAUSAL STATE: Primary | ICD-10-CM

## 2022-11-01 ENCOUNTER — LAB (OUTPATIENT)
Dept: LAB | Facility: CLINIC | Age: 42
End: 2022-11-01
Payer: COMMERCIAL

## 2022-11-01 DIAGNOSIS — N95.1 MENOPAUSAL STATE: ICD-10-CM

## 2022-11-01 DIAGNOSIS — R53.83 FATIGUE: ICD-10-CM

## 2022-11-01 LAB
CORTIS SERPL-MCNC: 11.1 UG/DL
DEPRECATED CALCIDIOL+CALCIFEROL SERPL-MC: 66 UG/L (ref 20–75)
FERRITIN SERPL-MCNC: 277 NG/ML (ref 6–175)
FSH SERPL IRP2-ACNC: 7.2 MIU/ML
LH SERPL-ACNC: 7 MIU/ML
PROLACTIN SERPL 3RD IS-MCNC: 14 NG/ML (ref 5–23)
SHBG SERPL-SCNC: 44 NMOL/L (ref 30–135)
SHBG SERPL-SCNC: 76 NMOL/L (ref 30–135)
VIT B12 SERPL-MCNC: 1345 PG/ML (ref 232–1245)

## 2022-11-01 PROCEDURE — 36415 COLL VENOUS BLD VENIPUNCTURE: CPT

## 2022-11-01 PROCEDURE — 82306 VITAMIN D 25 HYDROXY: CPT

## 2022-11-01 PROCEDURE — 82607 VITAMIN B-12: CPT

## 2022-11-01 PROCEDURE — 83002 ASSAY OF GONADOTROPIN (LH): CPT

## 2022-11-01 PROCEDURE — 83001 ASSAY OF GONADOTROPIN (FSH): CPT

## 2022-11-01 PROCEDURE — 82728 ASSAY OF FERRITIN: CPT

## 2022-11-01 PROCEDURE — 82627 DEHYDROEPIANDROSTERONE: CPT

## 2022-11-01 PROCEDURE — 84270 ASSAY OF SEX HORMONE GLOBUL: CPT

## 2022-11-01 PROCEDURE — 84403 ASSAY OF TOTAL TESTOSTERONE: CPT

## 2022-11-01 PROCEDURE — 82670 ASSAY OF TOTAL ESTRADIOL: CPT

## 2022-11-01 PROCEDURE — 82533 TOTAL CORTISOL: CPT

## 2022-11-01 PROCEDURE — 84146 ASSAY OF PROLACTIN: CPT

## 2022-11-02 LAB
DHEA-S SERPL-MCNC: 190 UG/DL (ref 35–430)
ESTRADIOL SERPL-MCNC: 68 PG/ML

## 2022-11-08 ENCOUNTER — LAB (OUTPATIENT)
Dept: LAB | Facility: CLINIC | Age: 42
End: 2022-11-08
Payer: COMMERCIAL

## 2022-11-08 DIAGNOSIS — R53.82 CHRONIC FATIGUE: Primary | ICD-10-CM

## 2022-11-08 LAB
ERYTHROCYTE [DISTWIDTH] IN BLOOD BY AUTOMATED COUNT: 12.2 % (ref 10–15)
FERRITIN SERPL-MCNC: 233 NG/ML (ref 6–175)
HCT VFR BLD AUTO: 37.8 % (ref 35–47)
HGB BLD-MCNC: 12.9 G/DL (ref 11.7–15.7)
IRON BINDING CAPACITY (ROCHE): 310 UG/DL (ref 240–430)
IRON SATN MFR SERPL: 43 % (ref 15–46)
IRON SERPL-MCNC: 133 UG/DL (ref 37–145)
MCH RBC QN AUTO: 30.2 PG (ref 26.5–33)
MCHC RBC AUTO-ENTMCNC: 34.1 G/DL (ref 31.5–36.5)
MCV RBC AUTO: 89 FL (ref 78–100)
PLATELET # BLD AUTO: 188 10E3/UL (ref 150–450)
RBC # BLD AUTO: 4.27 10E6/UL (ref 3.8–5.2)
TESTOST FREE SERPL-MCNC: 4.42 NG/DL
TESTOST SERPL-MCNC: 272 NG/DL (ref 8–60)
WBC # BLD AUTO: 8.5 10E3/UL (ref 4–11)

## 2022-11-08 PROCEDURE — 36415 COLL VENOUS BLD VENIPUNCTURE: CPT

## 2022-11-08 PROCEDURE — 83540 ASSAY OF IRON: CPT

## 2022-11-08 PROCEDURE — 82728 ASSAY OF FERRITIN: CPT

## 2022-11-08 PROCEDURE — 85027 COMPLETE CBC AUTOMATED: CPT

## 2022-11-08 PROCEDURE — 83550 IRON BINDING TEST: CPT

## 2022-11-16 DIAGNOSIS — R53.83 FATIGUE: Primary | ICD-10-CM

## 2022-11-28 ENCOUNTER — LAB (OUTPATIENT)
Dept: LAB | Facility: CLINIC | Age: 42
End: 2022-11-28
Payer: COMMERCIAL

## 2022-11-28 DIAGNOSIS — R53.83 FATIGUE: ICD-10-CM

## 2022-11-28 LAB — FERRITIN SERPL-MCNC: 188 NG/ML (ref 6–175)

## 2022-11-28 PROCEDURE — 36415 COLL VENOUS BLD VENIPUNCTURE: CPT

## 2022-11-28 PROCEDURE — 84403 ASSAY OF TOTAL TESTOSTERONE: CPT

## 2022-11-28 PROCEDURE — 82728 ASSAY OF FERRITIN: CPT

## 2022-11-30 LAB — TESTOST SERPL-MCNC: 9 NG/DL (ref 8–60)

## 2022-12-15 DIAGNOSIS — R79.89 HYPOURICEMIA: Primary | ICD-10-CM

## 2023-06-04 ENCOUNTER — HEALTH MAINTENANCE LETTER (OUTPATIENT)
Age: 43
End: 2023-06-04

## 2024-02-24 ENCOUNTER — HEALTH MAINTENANCE LETTER (OUTPATIENT)
Age: 44
End: 2024-02-24

## 2024-07-13 ENCOUNTER — HEALTH MAINTENANCE LETTER (OUTPATIENT)
Age: 44
End: 2024-07-13

## 2025-07-10 ENCOUNTER — TRANSCRIBE ORDERS (OUTPATIENT)
Dept: OTHER | Age: 45
End: 2025-07-10

## 2025-07-10 DIAGNOSIS — S83.241A TEAR OF MEDIAL MENISCUS OF RIGHT KNEE: ICD-10-CM

## 2025-07-10 DIAGNOSIS — M25.561 RIGHT KNEE PAIN: Primary | ICD-10-CM

## 2025-07-17 ENCOUNTER — THERAPY VISIT (OUTPATIENT)
Dept: PHYSICAL THERAPY | Facility: CLINIC | Age: 45
End: 2025-07-17
Attending: STUDENT IN AN ORGANIZED HEALTH CARE EDUCATION/TRAINING PROGRAM
Payer: COMMERCIAL

## 2025-07-17 DIAGNOSIS — M25.561 RIGHT KNEE PAIN: Primary | ICD-10-CM

## 2025-07-17 DIAGNOSIS — S83.241A TEAR OF MEDIAL MENISCUS OF RIGHT KNEE: ICD-10-CM

## 2025-07-17 PROCEDURE — 97161 PT EVAL LOW COMPLEX 20 MIN: CPT | Mod: GP

## 2025-07-17 PROCEDURE — 97110 THERAPEUTIC EXERCISES: CPT | Mod: GP

## 2025-07-17 ASSESSMENT — ACTIVITIES OF DAILY LIVING (ADL)
LIMPING: THE SYMPTOM AFFECTS MY ACTIVITY MODERATELY
HOW_WOULD_YOU_RATE_THE_CURRENT_FUNCTION_OF_YOUR_KNEE_DURING_YOUR_USUAL_DAILY_ACTIVITIES_ON_A_SCALE_FROM_0_TO_100_WITH_100_BEING_YOUR_LEVEL_OF_KNEE_FUNCTION_PRIOR_TO_YOUR_INJURY_AND_0_BEING_THE_INABILITY_TO_PERFORM_ANY_OF_YOUR_USUAL_DAILY_ACTIVITIES?: 40
AS_A_RESULT_OF_YOUR_KNEE_INJURY,_HOW_WOULD_YOU_RATE_YOUR_CURRENT_LEVEL_OF_DAILY_ACTIVITY?: ABNORMAL
STIFFNESS: THE SYMPTOM AFFECTS MY ACTIVITY MODERATELY
STIFFNESS: THE SYMPTOM AFFECTS MY ACTIVITY MODERATELY
GO UP STAIRS: ACTIVITY IS MINIMALLY DIFFICULT
HOW_WOULD_YOU_RATE_THE_CURRENT_FUNCTION_OF_YOUR_KNEE_DURING_YOUR_USUAL_DAILY_ACTIVITIES_ON_A_SCALE_FROM_0_TO_100_WITH_100_BEING_YOUR_LEVEL_OF_KNEE_FUNCTION_PRIOR_TO_YOUR_INJURY_AND_0_BEING_THE_INABILITY_TO_PERFORM_ANY_OF_YOUR_USUAL_DAILY_ACTIVITIES?: 40
WALK: ACTIVITY IS MINIMALLY DIFFICULT
WEAKNESS: THE SYMPTOM AFFECTS MY ACTIVITY MODERATELY
RISE FROM A CHAIR: ACTIVITY IS MINIMALLY DIFFICULT
HOW_WOULD_YOU_RATE_THE_OVERALL_FUNCTION_OF_YOUR_KNEE_DURING_YOUR_USUAL_DAILY_ACTIVITIES?: ABNORMAL
KNEE_ACTIVITY_OF_DAILY_LIVING_SUM: 38
STAND: ACTIVITY IS MINIMALLY DIFFICULT
GO DOWN STAIRS: ACTIVITY IS MINIMALLY DIFFICULT
RISE FROM A CHAIR: ACTIVITY IS MINIMALLY DIFFICULT
AS_A_RESULT_OF_YOUR_KNEE_INJURY,_HOW_WOULD_YOU_RATE_YOUR_CURRENT_LEVEL_OF_DAILY_ACTIVITY?: ABNORMAL
RAW_SCORE: 38
PAIN: THE SYMPTOM AFFECTS MY ACTIVITY MODERATELY
GO DOWN STAIRS: ACTIVITY IS MINIMALLY DIFFICULT
PLEASE_INDICATE_YOR_PRIMARY_REASON_FOR_REFERRAL_TO_THERAPY:: KNEE
SQUAT: ACTIVITY IS FAIRLY DIFFICULT
SIT WITH YOUR KNEE BENT: ACTIVITY IS VERY DIFFICULT
GIVING WAY, BUCKLING OR SHIFTING OF KNEE: THE SYMPTOM AFFECTS MY ACTIVITY SLIGHTLY
KNEEL ON THE FRONT OF YOUR KNEE: ACTIVITY IS VERY DIFFICULT
GO UP STAIRS: ACTIVITY IS MINIMALLY DIFFICULT
PAIN: THE SYMPTOM AFFECTS MY ACTIVITY MODERATELY
SIT WITH YOUR KNEE BENT: ACTIVITY IS VERY DIFFICULT
KNEE_ACTIVITY_OF_DAILY_LIVING_SCORE: 54.29
GIVING WAY, BUCKLING OR SHIFTING OF KNEE: THE SYMPTOM AFFECTS MY ACTIVITY SLIGHTLY
WEAKNESS: THE SYMPTOM AFFECTS MY ACTIVITY MODERATELY
SWELLING: THE SYMPTOM AFFECTS MY ACTIVITY SLIGHTLY
KNEEL ON THE FRONT OF YOUR KNEE: ACTIVITY IS VERY DIFFICULT
SWELLING: THE SYMPTOM AFFECTS MY ACTIVITY SLIGHTLY
WALK: ACTIVITY IS MINIMALLY DIFFICULT
HOW_WOULD_YOU_RATE_THE_OVERALL_FUNCTION_OF_YOUR_KNEE_DURING_YOUR_USUAL_DAILY_ACTIVITIES?: ABNORMAL
SQUAT: ACTIVITY IS FAIRLY DIFFICULT
LIMPING: THE SYMPTOM AFFECTS MY ACTIVITY MODERATELY
STAND: ACTIVITY IS MINIMALLY DIFFICULT

## 2025-07-17 NOTE — PROGRESS NOTES
"PHYSICAL THERAPY EVALUATION  Type of Visit: Evaluation       Fall Risk Screen:  Have you fallen 2 or more times in the past year?: No  Have you fallen and had an injury in the past year?: No  Is patient receiving Physical Therapy Services?: Yes    Subjective     Pt started having problems with her knee 1.5 years ago. Eventually couldn't run and an MRI found a radial tear in her R knee.        Presenting condition or subjective complaint: radial tear of meniscus  Date of onset: 24    Relevant medical history: Depression   Dates & types of surgery: breast reduction 2017maybe? hysterectomy  i think    Prior diagnostic imaging/testing results: MRI; X-ray     Prior therapy history for the same diagnosis, illness or injury: No      Prior Level of Function  Independent    Living Environment  Social support: With family members   Type of home: House; 2-story; Basement   Stairs to enter the home: Yes 3 Is there a railing: Yes     Ramp: No   Stairs inside the home: Yes 40 Is there a railing: Yes     Help at home: [employer] (home health aide/personal care attendant, family, etc)  Equipment owned:       Employment: Yes Sr.   Hobbies/Interests: running, tennis, pickleball, hiking, walking    Patient goals for therapy: run lunges squats bend kneel on knee    Pain assessment: Pain present  Location: lat R knee/Ratin-2/10     Objective   KNEE EVALUATION  PAIN: Pain Level at Rest: 1/10  Pain Level with Use: 10/10 pickleball  Pain Location: knee  Pain Quality: Aching, Sharp, and Shooting  Pain Frequency: constant  Pain is Worst: 1st thing in the morning and with activity  Pain is Exacerbated By: stairs, walking > 2miles, prolonged standing   Pain is Relieved By: cold and rest  Pain Progression: Improved  GAIT:  normal  BALANCE/PROPRIOCEPTION: Single Leg Stance Eyes Open (seconds): R 60\", L 60\"  ROM: AROM WFL  PROM WNL  STRENGTH: WNL  FLEXIBILITY: WFL  SPECIAL TESTS:    Right   Apley's (Meniscus) " Positive   Cherie's (Meniscus) Negative    Patellar Apprehension Test Negative    Ligamentous Stability Negative    Anterior Drawer (ACL) Negative,     Posterior Drawer (PCL) Negative,     Valgus Stress Testing at 0 Deg and 30 Deg Negative,     Varus Stress Testing at 0 Deg and 30 Deg  Negative,       PALPATION: tender along lateral joint line of R knee, no tenderness along medial joint line  JOINT MOBILITY: WNL      Assessment & Plan   CLINICAL IMPRESSIONS  Medical Diagnosis: Right knee pain;  Tear of medial meniscus of right knee    Treatment Diagnosis: R radial meniscus tear   Impression/Assessment: Patient is a 45 year old female with R knee complaints.  The following significant findings have been identified: Pain and Decreased strength. These impairments interfere with their ability to perform recreational activities as compared to previous level of function.     Clinical Decision Making (Complexity):  Clinical Presentation: Stable/Uncomplicated  Clinical Presentation Rationale: based on medical and personal factors listed in PT evaluation  Clinical Decision Making (Complexity): Low complexity    PLAN OF CARE  Treatment Interventions:  Interventions: Neuromuscular Re-education, Therapeutic Activity, Therapeutic Exercise    Long Term Goals     PT Goal 1  Goal Identifier: 1  Goal Description: Pt will be able to walk > 2 miles without pain.  Target Date: 08/21/25  PT Goal 2  Goal Identifier: 2  Goal Description: Pt will be able to navigate stairs reciprocally with one rail without pain.  Target Date: 09/11/25  PT Goal 3  Goal Identifier: 3  Goal Description: Pt will be able to squat without pain  Rationale: to maximize safety and independence with performance of ADLs and functional tasks  Target Date: 09/25/25  PT Goal 4  Goal Identifier: 4  Goal Description: Pt will be independent with HEP for optimal functional recovery.  Target Date: 09/25/25      Frequency of Treatment: 1x/week  Duration of Treatment: 10  weeks    Education Assessment:   Learner/Method: Patient;Listening;Demonstration;Pictures/Video;No Barriers to Learning    Risks and benefits of evaluation/treatment have been explained.   Patient/Family/caregiver agrees with Plan of Care.     Evaluation Time:     PT Eval, Low Complexity Minutes (09207): 15       Signing Clinician: Petty Nguyen PT

## 2025-07-19 ENCOUNTER — HEALTH MAINTENANCE LETTER (OUTPATIENT)
Age: 45
End: 2025-07-19

## (undated) DEVICE — PACK MINOR CUSTOM ASC

## (undated) DEVICE — GLOVE PROTEXIS W/NEU-THERA 7.0  2D73TE70

## (undated) DEVICE — BLADE KNIFE SURG 10 371110

## (undated) DEVICE — SYR BULB IRRIG 50ML LATEX FREE 0035280

## (undated) DEVICE — DRAPE IOBAN INCISE 23X17" 6650EZ

## (undated) DEVICE — SU MONOCRYL 3-0 PS-1 27" Y936H

## (undated) DEVICE — STPL SKIN 35W 059037

## (undated) DEVICE — GOWN IMPERVIOUS LEVEL 4 BLUE

## (undated) DEVICE — LINEN TOWEL PACK X6 WHITE 5487

## (undated) DEVICE — DRAPE U SPLIT 74X120" 29440

## (undated) DEVICE — PREP CHLORAPREP 26ML TINTED ORANGE  260815

## (undated) DEVICE — SPONGE LAP 18X18" X8435

## (undated) DEVICE — ESU ELEC BLADE HEX-LOCKING 2.5" E1450X

## (undated) DEVICE — ESU GROUND PAD ADULT W/CORD E7507

## (undated) DEVICE — SUCTION TIP YANKAUER W/O VENT K86

## (undated) DEVICE — PAD ARMBOARD FOAM EGGCRATE 50676-378

## (undated) DEVICE — DRSG ABDOMINAL 07 1/2X8" 7197D

## (undated) DEVICE — DRSG KERLIX 4 1/2"X4YDS ROLL 6730

## (undated) DEVICE — SOL NACL 0.9% IRRIG 1000ML BOTTLE 2F7124

## (undated) DEVICE — BNDG ELASTIC 6" DBL LENGTH UNSTERILE 6611-16

## (undated) DEVICE — SU MONOCRYL 2-0 SH 27" UND Y417H

## (undated) DEVICE — SU STRATAFIX 2-0 MH 36" SXPD2B412

## (undated) DEVICE — PAD CHUX UNDERPAD 30X30"

## (undated) DEVICE — SUCTION MANIFOLD NEPTUNE 2 SYS 1 PORT 702-025-000

## (undated) DEVICE — SOL WATER IRRIG 500ML BOTTLE 2F7113

## (undated) DEVICE — LABEL MEDICATION SYSTEM 3303-P

## (undated) DEVICE — ESU PENCIL SMOKE EVAC W/ROCKER SWITCH 0703-047-000

## (undated) DEVICE — Device

## (undated) DEVICE — LINEN TOWEL PACK X5 5464

## (undated) DEVICE — SU VICRYL 2-0 TIE 12X18" J905T

## (undated) RX ORDER — ACETAMINOPHEN 325 MG/1
TABLET ORAL
Status: DISPENSED
Start: 2018-03-26

## (undated) RX ORDER — CEFAZOLIN SODIUM 1 G/3ML
INJECTION, POWDER, FOR SOLUTION INTRAMUSCULAR; INTRAVENOUS
Status: DISPENSED
Start: 2018-03-26

## (undated) RX ORDER — FENTANYL CITRATE 50 UG/ML
INJECTION, SOLUTION INTRAMUSCULAR; INTRAVENOUS
Status: DISPENSED
Start: 2018-03-26

## (undated) RX ORDER — ONDANSETRON 2 MG/ML
INJECTION INTRAMUSCULAR; INTRAVENOUS
Status: DISPENSED
Start: 2018-03-26

## (undated) RX ORDER — KETOROLAC TROMETHAMINE 30 MG/ML
INJECTION, SOLUTION INTRAMUSCULAR; INTRAVENOUS
Status: DISPENSED
Start: 2018-03-26

## (undated) RX ORDER — DEXAMETHASONE SODIUM PHOSPHATE 4 MG/ML
INJECTION, SOLUTION INTRA-ARTICULAR; INTRALESIONAL; INTRAMUSCULAR; INTRAVENOUS; SOFT TISSUE
Status: DISPENSED
Start: 2018-03-26

## (undated) RX ORDER — PROPOFOL 10 MG/ML
INJECTION, EMULSION INTRAVENOUS
Status: DISPENSED
Start: 2018-03-26

## (undated) RX ORDER — OXYCODONE HYDROCHLORIDE 5 MG/1
TABLET ORAL
Status: DISPENSED
Start: 2018-03-26

## (undated) RX ORDER — GABAPENTIN 300 MG/1
CAPSULE ORAL
Status: DISPENSED
Start: 2018-03-26